# Patient Record
Sex: FEMALE | Race: WHITE | NOT HISPANIC OR LATINO
[De-identification: names, ages, dates, MRNs, and addresses within clinical notes are randomized per-mention and may not be internally consistent; named-entity substitution may affect disease eponyms.]

---

## 2017-09-21 ENCOUNTER — RECORD ABSTRACTING (OUTPATIENT)
Age: 67
End: 2017-09-21

## 2017-09-21 DIAGNOSIS — Z84.89 FAMILY HISTORY OF OTHER SPECIFIED CONDITIONS: ICD-10-CM

## 2017-09-21 DIAGNOSIS — Z83.511 FAMILY HISTORY OF GLAUCOMA: ICD-10-CM

## 2017-09-21 DIAGNOSIS — Z78.9 OTHER SPECIFIED HEALTH STATUS: ICD-10-CM

## 2017-09-21 DIAGNOSIS — Z82.3 FAMILY HISTORY OF STROKE: ICD-10-CM

## 2017-09-21 DIAGNOSIS — Z98.890 OTHER SPECIFIED POSTPROCEDURAL STATES: ICD-10-CM

## 2017-09-21 DIAGNOSIS — Z83.3 FAMILY HISTORY OF DIABETES MELLITUS: ICD-10-CM

## 2017-09-21 DIAGNOSIS — Z82.49 FAMILY HISTORY OF ISCHEMIC HEART DISEASE AND OTHER DISEASES OF THE CIRCULATORY SYSTEM: ICD-10-CM

## 2017-09-21 DIAGNOSIS — Z92.89 PERSONAL HISTORY OF OTHER MEDICAL TREATMENT: ICD-10-CM

## 2017-09-21 DIAGNOSIS — Z80.52 FAMILY HISTORY OF MALIGNANT NEOPLASM OF BLADDER: ICD-10-CM

## 2017-09-21 DIAGNOSIS — Z12.72 ENCOUNTER FOR SCREENING FOR MALIGNANT NEOPLASM OF VAGINA: ICD-10-CM

## 2017-09-21 DIAGNOSIS — Z80.1 FAMILY HISTORY OF MALIGNANT NEOPLASM OF TRACHEA, BRONCHUS AND LUNG: ICD-10-CM

## 2017-09-21 DIAGNOSIS — G47.30 SLEEP APNEA, UNSPECIFIED: ICD-10-CM

## 2017-09-21 DIAGNOSIS — F15.90 OTHER STIMULANT USE, UNSPECIFIED, UNCOMPLICATED: ICD-10-CM

## 2017-10-16 ENCOUNTER — APPOINTMENT (OUTPATIENT)
Dept: FAMILY MEDICINE | Facility: CLINIC | Age: 67
End: 2017-10-16
Payer: COMMERCIAL

## 2017-10-16 VITALS
HEIGHT: 66 IN | WEIGHT: 229 LBS | BODY MASS INDEX: 36.8 KG/M2 | RESPIRATION RATE: 16 BRPM | SYSTOLIC BLOOD PRESSURE: 160 MMHG | DIASTOLIC BLOOD PRESSURE: 94 MMHG | HEART RATE: 75 BPM | OXYGEN SATURATION: 99 %

## 2017-10-16 VITALS — DIASTOLIC BLOOD PRESSURE: 80 MMHG | SYSTOLIC BLOOD PRESSURE: 136 MMHG

## 2017-10-16 PROCEDURE — 99213 OFFICE O/P EST LOW 20 MIN: CPT | Mod: 25

## 2017-10-16 PROCEDURE — G0008: CPT

## 2017-10-16 PROCEDURE — 90662 IIV NO PRSV INCREASED AG IM: CPT

## 2018-01-24 ENCOUNTER — APPOINTMENT (OUTPATIENT)
Dept: FAMILY MEDICINE | Facility: CLINIC | Age: 68
End: 2018-01-24
Payer: COMMERCIAL

## 2018-01-24 VITALS
HEIGHT: 66 IN | DIASTOLIC BLOOD PRESSURE: 78 MMHG | OXYGEN SATURATION: 98 % | HEART RATE: 88 BPM | BODY MASS INDEX: 36.8 KG/M2 | SYSTOLIC BLOOD PRESSURE: 132 MMHG | WEIGHT: 229 LBS

## 2018-01-24 DIAGNOSIS — M50.10 CERVICAL DISC DISORDER WITH RADICULOPATHY, UNSPECIFIED CERVICAL REGION: ICD-10-CM

## 2018-01-24 LAB — HBA1C MFR BLD HPLC: 10.5

## 2018-01-24 PROCEDURE — 99214 OFFICE O/P EST MOD 30 MIN: CPT

## 2018-01-24 RX ORDER — AZITHROMYCIN 250 MG/1
250 TABLET, FILM COATED ORAL
Qty: 6 | Refills: 0 | Status: DISCONTINUED | COMMUNITY
Start: 2017-04-17 | End: 2018-01-24

## 2018-01-24 RX ORDER — SAXAGLIPTIN AND METFORMIN HYDROCHLORIDE 5; 1000 MG/1; MG/1
5-1000 TABLET, FILM COATED, EXTENDED RELEASE ORAL DAILY
Refills: 0 | Status: DISCONTINUED | COMMUNITY
End: 2018-01-24

## 2018-01-24 RX ORDER — FLUOCINONIDE 0.5 MG/G
0.05 CREAM TOPICAL
Qty: 60 | Refills: 0 | Status: DISCONTINUED | COMMUNITY
Start: 2017-07-17 | End: 2018-01-24

## 2018-01-24 RX ORDER — METRONIDAZOLE 500 MG/1
5 TABLET ORAL 3 TIMES DAILY
Refills: 0 | Status: DISCONTINUED | COMMUNITY
End: 2018-01-24

## 2018-01-24 RX ORDER — FLURANDRENOLIDE 4 UG/CM2
TAPE TOPICAL
Refills: 0 | Status: DISCONTINUED | COMMUNITY
End: 2018-01-24

## 2018-04-16 ENCOUNTER — APPOINTMENT (OUTPATIENT)
Dept: FAMILY MEDICINE | Facility: CLINIC | Age: 68
End: 2018-04-16
Payer: COMMERCIAL

## 2018-04-16 VITALS
HEART RATE: 90 BPM | WEIGHT: 222 LBS | SYSTOLIC BLOOD PRESSURE: 124 MMHG | RESPIRATION RATE: 14 BRPM | HEIGHT: 66 IN | OXYGEN SATURATION: 98 % | DIASTOLIC BLOOD PRESSURE: 76 MMHG | BODY MASS INDEX: 35.68 KG/M2

## 2018-04-16 LAB
ALBUMIN: NORMAL
HBA1C MFR BLD HPLC: 7.2

## 2018-04-16 PROCEDURE — 99213 OFFICE O/P EST LOW 20 MIN: CPT

## 2018-04-16 RX ORDER — INSULIN LISPRO 100 [IU]/ML
100 INJECTION, SOLUTION INTRAVENOUS; SUBCUTANEOUS
Qty: 6 | Refills: 0 | Status: DISCONTINUED | COMMUNITY
Start: 2018-01-16 | End: 2018-04-16

## 2018-04-16 RX ORDER — INSULIN GLARGINE 300 U/ML
300 INJECTION, SOLUTION SUBCUTANEOUS
Refills: 0 | Status: DISCONTINUED | COMMUNITY
Start: 2018-01-24 | End: 2018-04-16

## 2018-04-16 RX ORDER — INSULIN ASPART 100 [IU]/ML
100 INJECTION, SOLUTION INTRAVENOUS; SUBCUTANEOUS
Refills: 0 | Status: DISCONTINUED | COMMUNITY
Start: 2018-01-24 | End: 2018-04-16

## 2018-04-16 RX ORDER — CYCLOBENZAPRINE HYDROCHLORIDE 10 MG/1
10 TABLET, FILM COATED ORAL
Qty: 30 | Refills: 1 | Status: DISCONTINUED | COMMUNITY
Start: 2018-01-24 | End: 2018-04-16

## 2018-04-20 ENCOUNTER — TRANSCRIPTION ENCOUNTER (OUTPATIENT)
Age: 68
End: 2018-04-20

## 2018-07-18 ENCOUNTER — APPOINTMENT (OUTPATIENT)
Dept: FAMILY MEDICINE | Facility: CLINIC | Age: 68
End: 2018-07-18
Payer: COMMERCIAL

## 2018-07-18 VITALS — SYSTOLIC BLOOD PRESSURE: 130 MMHG | DIASTOLIC BLOOD PRESSURE: 70 MMHG

## 2018-07-18 VITALS
DIASTOLIC BLOOD PRESSURE: 80 MMHG | BODY MASS INDEX: 35.68 KG/M2 | WEIGHT: 222 LBS | OXYGEN SATURATION: 98 % | HEIGHT: 66 IN | RESPIRATION RATE: 14 BRPM | HEART RATE: 87 BPM | SYSTOLIC BLOOD PRESSURE: 124 MMHG

## 2018-07-18 LAB — HBA1C MFR BLD HPLC: 7

## 2018-07-18 PROCEDURE — 99213 OFFICE O/P EST LOW 20 MIN: CPT

## 2018-07-18 RX ORDER — SOY ISOFLAVONE 40 MG
TABLET ORAL
Refills: 0 | Status: DISCONTINUED | COMMUNITY
End: 2018-07-18

## 2018-07-18 NOTE — ASSESSMENT
[FreeTextEntry1] : her condition is stable, I will obtain her lab results, renewed oxybutynin and she will follow up in 3 months or sooner prn

## 2018-07-18 NOTE — PHYSICAL EXAM
[No Acute Distress] : no acute distress [Well Nourished] : well nourished [Well Developed] : well developed [Well-Appearing] : well-appearing [Normal Voice/Communication] : normal voice/communication [No Respiratory Distress] : no respiratory distress  [Clear to Auscultation] : lungs were clear to auscultation bilaterally [No Accessory Muscle Use] : no accessory muscle use [Normal Rate] : normal rate  [Regular Rhythm] : with a regular rhythm [Normal S1, S2] : normal S1 and S2 [No Murmur] : no murmur heard [Speech Grossly Normal] : speech grossly normal [Memory Grossly Normal] : memory grossly normal [Normal Affect] : the affect was normal [Normal Mood] : the mood was normal [Normal Insight/Judgement] : insight and judgment were intact [de-identified] : trace edema bilateral LE's

## 2018-07-18 NOTE — HISTORY OF PRESENT ILLNESS
[FreeTextEntry1] : patient presents for 3 month check  [de-identified] : She had recent labs from the endocrinologist.  She went to [facility] in june.  The glimepiride was lowered to 2 mg bid.  She is feeling well.  She is declining receiving any pneumococcal vaccines.

## 2018-08-22 ENCOUNTER — RX RENEWAL (OUTPATIENT)
Age: 68
End: 2018-08-22

## 2018-09-04 ENCOUNTER — APPOINTMENT (OUTPATIENT)
Dept: FAMILY MEDICINE | Facility: CLINIC | Age: 68
End: 2018-09-04
Payer: COMMERCIAL

## 2018-09-04 VITALS
OXYGEN SATURATION: 97 % | SYSTOLIC BLOOD PRESSURE: 140 MMHG | RESPIRATION RATE: 14 BRPM | HEART RATE: 81 BPM | WEIGHT: 226 LBS | DIASTOLIC BLOOD PRESSURE: 80 MMHG | BODY MASS INDEX: 36.32 KG/M2 | HEIGHT: 66 IN

## 2018-09-04 VITALS — SYSTOLIC BLOOD PRESSURE: 130 MMHG | DIASTOLIC BLOOD PRESSURE: 74 MMHG

## 2018-09-04 DIAGNOSIS — M25.472 EFFUSION, RIGHT ANKLE: ICD-10-CM

## 2018-09-04 DIAGNOSIS — M25.561 PAIN IN RIGHT KNEE: ICD-10-CM

## 2018-09-04 DIAGNOSIS — L24.9 IRRITANT CONTACT DERMATITIS, UNSPECIFIED CAUSE: ICD-10-CM

## 2018-09-04 DIAGNOSIS — E11.65 TYPE 2 DIABETES MELLITUS WITH HYPERGLYCEMIA: ICD-10-CM

## 2018-09-04 DIAGNOSIS — R59.0 LOCALIZED ENLARGED LYMPH NODES: ICD-10-CM

## 2018-09-04 DIAGNOSIS — M25.471 EFFUSION, RIGHT ANKLE: ICD-10-CM

## 2018-09-04 DIAGNOSIS — Z87.39 PERSONAL HISTORY OF OTHER DISEASES OF THE MUSCULOSKELETAL SYSTEM AND CONNECTIVE TISSUE: ICD-10-CM

## 2018-09-04 PROCEDURE — 99242 OFF/OP CONSLTJ NEW/EST SF 20: CPT

## 2018-09-06 NOTE — HISTORY OF PRESENT ILLNESS
[No Pertinent Cardiac History] : no history of aortic stenosis, atrial fibrillation, coronary artery disease, recent myocardial infarction, or implantable device/pacemaker [Sleep Apnea] : sleep apnea [Self] : previous adverse anesthesia reaction [Diabetes] : diabetes [Aortic Stenosis] : no aortic stenosis [Atrial Fibrillation] : no atrial fibrillation [Coronary Artery Disease] : no coronary artery disease [Recent Myocardial Infarction] : no recent myocardial infarction [Implantable Device/Pacemaker] : no implantable device/pacemaker [Asthma] : no asthma [COPD] : no COPD [Smoker] : not a smoker [Family Member] : no family member with adverse anesthesia reaction/sudden death [Chronic Anticoagulation] : no chronic anticoagulation [Chronic Kidney Disease] : no chronic kidney disease [FreeTextEntry1] : right shoulder surgery [FreeTextEntry2] : to be determined [FreeTextEntry3] : Sandoval Tinajero MD [FreeTextEntry4] : pt presents for medical clearance

## 2018-09-06 NOTE — ASSESSMENT
[FreeTextEntry4] : she is medically stable for surgery as long as preop tests are within an acceptable range

## 2018-09-06 NOTE — PHYSICAL EXAM
[No Acute Distress] : no acute distress [Well Nourished] : well nourished [Well Developed] : well developed [Well-Appearing] : well-appearing [Normal Voice/Communication] : normal voice/communication [Normal Sclera/Conjunctiva] : normal sclera/conjunctiva [Normal Outer Ear/Nose] : the outer ears and nose were normal in appearance [Normal Oropharynx] : the oropharynx was normal [Normal TMs] : both tympanic membranes were normal [No JVD] : no jugular venous distention [Supple] : supple [No Lymphadenopathy] : no lymphadenopathy [Thyroid Normal, No Nodules] : the thyroid was normal and there were no nodules present [No Respiratory Distress] : no respiratory distress  [Clear to Auscultation] : lungs were clear to auscultation bilaterally [No Accessory Muscle Use] : no accessory muscle use [Normal Rate] : normal rate  [Regular Rhythm] : with a regular rhythm [Normal S1, S2] : normal S1 and S2 [No Carotid Bruits] : no carotid bruits [No Edema] : there was no peripheral edema [Soft] : abdomen soft [Non Tender] : non-tender [Non-distended] : non-distended [No HSM] : no HSM [Normal Bowel Sounds] : normal bowel sounds [Speech Grossly Normal] : speech grossly normal [Memory Grossly Normal] : memory grossly normal [Normal Affect] : the affect was normal [Normal Mood] : the mood was normal [Normal Insight/Judgement] : insight and judgment were intact [de-identified] : RUE in sling

## 2018-09-06 NOTE — ADDENDUM
[FreeTextEntry1] : I reviewed her presurgical labs, she is medically stable for surgery.  I reviewed her EKG, it is unchanged from a prior office EKG performed on 1/16/14, she is medically stable for surgery

## 2018-09-27 ENCOUNTER — RECORD ABSTRACTING (OUTPATIENT)
Age: 68
End: 2018-09-27

## 2018-09-27 ENCOUNTER — RESULT CHARGE (OUTPATIENT)
Age: 68
End: 2018-09-27

## 2018-09-27 ENCOUNTER — APPOINTMENT (OUTPATIENT)
Dept: ENDOCRINOLOGY | Facility: CLINIC | Age: 68
End: 2018-09-27
Payer: COMMERCIAL

## 2018-09-27 VITALS
HEART RATE: 99 BPM | WEIGHT: 219 LBS | BODY MASS INDEX: 35.2 KG/M2 | HEIGHT: 66 IN | SYSTOLIC BLOOD PRESSURE: 130 MMHG | DIASTOLIC BLOOD PRESSURE: 80 MMHG

## 2018-09-27 LAB — GLUCOSE BLDC GLUCOMTR-MCNC: 138

## 2018-09-27 PROCEDURE — 99213 OFFICE O/P EST LOW 20 MIN: CPT | Mod: 25

## 2018-09-27 PROCEDURE — 82962 GLUCOSE BLOOD TEST: CPT

## 2018-10-18 ENCOUNTER — MEDICATION RENEWAL (OUTPATIENT)
Age: 68
End: 2018-10-18

## 2018-10-24 ENCOUNTER — APPOINTMENT (OUTPATIENT)
Dept: FAMILY MEDICINE | Facility: CLINIC | Age: 68
End: 2018-10-24
Payer: COMMERCIAL

## 2018-10-24 VITALS
OXYGEN SATURATION: 98 % | DIASTOLIC BLOOD PRESSURE: 88 MMHG | BODY MASS INDEX: 35.68 KG/M2 | WEIGHT: 222 LBS | HEIGHT: 66 IN | SYSTOLIC BLOOD PRESSURE: 140 MMHG | RESPIRATION RATE: 14 BRPM | HEART RATE: 99 BPM

## 2018-10-24 VITALS — DIASTOLIC BLOOD PRESSURE: 84 MMHG | SYSTOLIC BLOOD PRESSURE: 138 MMHG

## 2018-10-24 PROCEDURE — 99213 OFFICE O/P EST LOW 20 MIN: CPT

## 2018-10-24 NOTE — HISTORY OF PRESENT ILLNESS
[FreeTextEntry1] : Pt presents for med check [de-identified] : The shoulder surgery went well and she just started PT.  Dr Tinajero started her on gabapentin 300 mg tid.  She wants to postpone receiving the flu vaccine.  Her recent labs show her diabetes is under much better control.

## 2018-10-24 NOTE — REVIEW OF SYSTEMS
[Negative] : Heme/Lymph [FreeTextEntry9] : pain in right shoulder [de-identified] : skin sensitivity of right upper arm

## 2018-10-24 NOTE — ASSESSMENT
[FreeTextEntry1] : she was advised to continue current medications, her BP is high-normal today but she is in a lot of pain, she will follow up in 3 months or sooner if needed and she will receive a flu vaccine within the next month

## 2018-10-24 NOTE — PHYSICAL EXAM
[No Acute Distress] : no acute distress [Well Nourished] : well nourished [Well Developed] : well developed [Well-Appearing] : well-appearing [Normal Voice/Communication] : normal voice/communication [No Respiratory Distress] : no respiratory distress  [Clear to Auscultation] : lungs were clear to auscultation bilaterally [No Accessory Muscle Use] : no accessory muscle use [Normal Rate] : normal rate  [Regular Rhythm] : with a regular rhythm [Normal S1, S2] : normal S1 and S2 [No Carotid Bruits] : no carotid bruits [No Edema] : there was no peripheral edema [Speech Grossly Normal] : speech grossly normal [Memory Grossly Normal] : memory grossly normal [Normal Mood] : the mood was normal [Normal Insight/Judgement] : insight and judgment were intact

## 2018-11-02 ENCOUNTER — TRANSCRIPTION ENCOUNTER (OUTPATIENT)
Age: 68
End: 2018-11-02

## 2018-11-13 ENCOUNTER — RX RENEWAL (OUTPATIENT)
Age: 68
End: 2018-11-13

## 2019-01-04 ENCOUNTER — RECORD ABSTRACTING (OUTPATIENT)
Age: 69
End: 2019-01-04

## 2019-01-04 DIAGNOSIS — Z87.39 PERSONAL HISTORY OF OTHER DISEASES OF THE MUSCULOSKELETAL SYSTEM AND CONNECTIVE TISSUE: ICD-10-CM

## 2019-01-04 DIAGNOSIS — M50.23 OTHER CERVICAL DISC DISPLACEMENT, CERVICOTHORACIC REGION: ICD-10-CM

## 2019-01-10 ENCOUNTER — RESULT CHARGE (OUTPATIENT)
Age: 69
End: 2019-01-10

## 2019-01-10 ENCOUNTER — APPOINTMENT (OUTPATIENT)
Dept: ENDOCRINOLOGY | Facility: CLINIC | Age: 69
End: 2019-01-10
Payer: COMMERCIAL

## 2019-01-10 VITALS
HEIGHT: 66 IN | HEART RATE: 102 BPM | BODY MASS INDEX: 38.25 KG/M2 | WEIGHT: 238 LBS | DIASTOLIC BLOOD PRESSURE: 80 MMHG | SYSTOLIC BLOOD PRESSURE: 140 MMHG

## 2019-01-10 LAB — GLUCOSE BLDC GLUCOMTR-MCNC: 66

## 2019-01-10 PROCEDURE — 82962 GLUCOSE BLOOD TEST: CPT

## 2019-01-10 PROCEDURE — 99214 OFFICE O/P EST MOD 30 MIN: CPT | Mod: 25

## 2019-01-10 RX ORDER — GABAPENTIN 300 MG/1
300 CAPSULE ORAL 3 TIMES DAILY
Qty: 90 | Refills: 3 | Status: DISCONTINUED | COMMUNITY
End: 2019-01-10

## 2019-01-16 ENCOUNTER — MEDICATION RENEWAL (OUTPATIENT)
Age: 69
End: 2019-01-16

## 2019-01-23 ENCOUNTER — APPOINTMENT (OUTPATIENT)
Dept: FAMILY MEDICINE | Facility: CLINIC | Age: 69
End: 2019-01-23
Payer: COMMERCIAL

## 2019-01-23 VITALS
WEIGHT: 232 LBS | BODY MASS INDEX: 37.28 KG/M2 | SYSTOLIC BLOOD PRESSURE: 132 MMHG | OXYGEN SATURATION: 97 % | DIASTOLIC BLOOD PRESSURE: 76 MMHG | HEART RATE: 89 BPM | HEIGHT: 66 IN

## 2019-01-23 VITALS — DIASTOLIC BLOOD PRESSURE: 80 MMHG | SYSTOLIC BLOOD PRESSURE: 124 MMHG

## 2019-01-23 PROCEDURE — 99213 OFFICE O/P EST LOW 20 MIN: CPT

## 2019-01-23 RX ORDER — INSULIN DETEMIR 100 [IU]/ML
100 INJECTION, SOLUTION SUBCUTANEOUS
Qty: 2 | Refills: 1 | Status: DISCONTINUED | COMMUNITY
Start: 2018-01-12 | End: 2019-01-23

## 2019-01-23 NOTE — ASSESSMENT
[FreeTextEntry1] : she was given emotional support and will follow up with Dr Tinajero in february as scheduled, I renewed oxybutynin and she will follow up in 3 months

## 2019-01-23 NOTE — HISTORY OF PRESENT ILLNESS
[FreeTextEntry1] : Pt presents for 3 month follow up [de-identified] : She still has pain and limited motion with the right shoulder.  She is still in PT.  She will be changing from Levemir to Basaglar and the dose was increased to 24 units.

## 2019-01-23 NOTE — PHYSICAL EXAM
[No Acute Distress] : no acute distress [Well Nourished] : well nourished [Well Developed] : well developed [Well-Appearing] : well-appearing [No Respiratory Distress] : no respiratory distress  [Clear to Auscultation] : lungs were clear to auscultation bilaterally [No Accessory Muscle Use] : no accessory muscle use [Normal Rate] : normal rate  [Regular Rhythm] : with a regular rhythm [Normal S1, S2] : normal S1 and S2 [Speech Grossly Normal] : speech grossly normal [Memory Grossly Normal] : memory grossly normal [Normal Mood] : the mood was normal [Normal Insight/Judgement] : insight and judgment were intact [de-identified] : surgical scars noted in right upper arm, decreased ROM

## 2019-01-23 NOTE — REVIEW OF SYSTEMS
[Negative] : Heme/Lymph [FreeTextEntry9] : right shoulder and upper arm pain, depression in upper arm

## 2019-02-08 ENCOUNTER — MEDICATION RENEWAL (OUTPATIENT)
Age: 69
End: 2019-02-08

## 2019-02-11 ENCOUNTER — TRANSCRIPTION ENCOUNTER (OUTPATIENT)
Age: 69
End: 2019-02-11

## 2019-02-11 ENCOUNTER — RX RENEWAL (OUTPATIENT)
Age: 69
End: 2019-02-11

## 2019-03-04 ENCOUNTER — TRANSCRIPTION ENCOUNTER (OUTPATIENT)
Age: 69
End: 2019-03-04

## 2019-04-10 LAB
HBA1C MFR BLD HPLC: 7.1
LDLC SERPL DIRECT ASSAY-MCNC: 66

## 2019-04-11 ENCOUNTER — RESULT CHARGE (OUTPATIENT)
Age: 69
End: 2019-04-11

## 2019-04-11 ENCOUNTER — APPOINTMENT (OUTPATIENT)
Dept: ENDOCRINOLOGY | Facility: CLINIC | Age: 69
End: 2019-04-11
Payer: COMMERCIAL

## 2019-04-11 VITALS
DIASTOLIC BLOOD PRESSURE: 80 MMHG | WEIGHT: 232 LBS | HEART RATE: 105 BPM | HEIGHT: 66 IN | SYSTOLIC BLOOD PRESSURE: 144 MMHG | BODY MASS INDEX: 37.28 KG/M2

## 2019-04-11 LAB — GLUCOSE BLDC GLUCOMTR-MCNC: 100

## 2019-04-11 PROCEDURE — 82962 GLUCOSE BLOOD TEST: CPT

## 2019-04-11 PROCEDURE — 99214 OFFICE O/P EST MOD 30 MIN: CPT | Mod: 25

## 2019-04-24 ENCOUNTER — APPOINTMENT (OUTPATIENT)
Dept: FAMILY MEDICINE | Facility: CLINIC | Age: 69
End: 2019-04-24
Payer: COMMERCIAL

## 2019-04-24 VITALS — DIASTOLIC BLOOD PRESSURE: 78 MMHG | SYSTOLIC BLOOD PRESSURE: 126 MMHG

## 2019-04-24 VITALS
DIASTOLIC BLOOD PRESSURE: 68 MMHG | WEIGHT: 230 LBS | HEART RATE: 114 BPM | OXYGEN SATURATION: 97 % | BODY MASS INDEX: 36.96 KG/M2 | RESPIRATION RATE: 14 BRPM | SYSTOLIC BLOOD PRESSURE: 114 MMHG | HEIGHT: 66 IN

## 2019-04-24 LAB — MICROALBUMIN 24H UR DL<=1MG/L-MCNC: NORMAL

## 2019-04-24 PROCEDURE — 99213 OFFICE O/P EST LOW 20 MIN: CPT

## 2019-04-24 NOTE — PLAN
[FreeTextEntry1] : I renwed lisinopril, she will follow up with ortho as scheduled and with me in 3 months or sooner prn

## 2019-04-24 NOTE — HISTORY OF PRESENT ILLNESS
[FreeTextEntry1] : Pt presents for 3 month follow up [de-identified] : She is still having discomfort in the right shoulder especially when lifting it above her head.  Her diabetes is better controlled.  She is walking her dogs again and happy about that.

## 2019-04-24 NOTE — PHYSICAL EXAM
[No Acute Distress] : no acute distress [Well Developed] : well developed [Well Nourished] : well nourished [Well-Appearing] : well-appearing [No Respiratory Distress] : no respiratory distress  [Normal Voice/Communication] : normal voice/communication [Clear to Auscultation] : lungs were clear to auscultation bilaterally [No Accessory Muscle Use] : no accessory muscle use [Regular Rhythm] : with a regular rhythm [Normal Rate] : normal rate  [Normal S1, S2] : normal S1 and S2 [Speech Grossly Normal] : speech grossly normal [Normal Affect] : the affect was normal [Memory Grossly Normal] : memory grossly normal [Normal Insight/Judgement] : insight and judgment were intact [Normal Mood] : the mood was normal

## 2019-07-10 LAB
HBA1C MFR BLD HPLC: 7.1
LDLC SERPL DIRECT ASSAY-MCNC: 66

## 2019-07-11 ENCOUNTER — RESULT CHARGE (OUTPATIENT)
Age: 69
End: 2019-07-11

## 2019-07-11 ENCOUNTER — APPOINTMENT (OUTPATIENT)
Dept: ENDOCRINOLOGY | Facility: CLINIC | Age: 69
End: 2019-07-11
Payer: COMMERCIAL

## 2019-07-11 VITALS
DIASTOLIC BLOOD PRESSURE: 84 MMHG | WEIGHT: 241 LBS | HEIGHT: 66 IN | HEART RATE: 89 BPM | BODY MASS INDEX: 38.73 KG/M2 | SYSTOLIC BLOOD PRESSURE: 134 MMHG

## 2019-07-11 LAB — GLUCOSE BLDC GLUCOMTR-MCNC: 115

## 2019-07-11 PROCEDURE — 82962 GLUCOSE BLOOD TEST: CPT

## 2019-07-11 PROCEDURE — 99214 OFFICE O/P EST MOD 30 MIN: CPT | Mod: 25

## 2019-07-15 ENCOUNTER — APPOINTMENT (OUTPATIENT)
Dept: FAMILY MEDICINE | Facility: CLINIC | Age: 69
End: 2019-07-15
Payer: COMMERCIAL

## 2019-07-15 ENCOUNTER — APPOINTMENT (OUTPATIENT)
Dept: FAMILY MEDICINE | Facility: CLINIC | Age: 69
End: 2019-07-15

## 2019-07-15 VITALS
HEART RATE: 72 BPM | DIASTOLIC BLOOD PRESSURE: 78 MMHG | OXYGEN SATURATION: 98 % | WEIGHT: 244 LBS | BODY MASS INDEX: 39.38 KG/M2 | SYSTOLIC BLOOD PRESSURE: 128 MMHG | RESPIRATION RATE: 12 BRPM

## 2019-07-15 DIAGNOSIS — R60.9 EDEMA, UNSPECIFIED: ICD-10-CM

## 2019-07-15 PROCEDURE — 99213 OFFICE O/P EST LOW 20 MIN: CPT

## 2019-07-15 NOTE — PLAN
[FreeTextEntry1] : she was advised to apply bacitracin ointment and to wear compression stockings and elevate legs, she will continue heat to her neck and will follow up in 3 months or sooner prn

## 2019-07-15 NOTE — HISTORY OF PRESENT ILLNESS
[FreeTextEntry1] : pt presents for 3 month check  [de-identified] : She was doing exercises for the arm and neck and she injured her neck on the left side.  She still has pain and weakness in the right arm.  She just saw the endocrinologist and no changes were made.  She finds that if she has a carb with her meals she can prevent her sugar from dropping too low.  She was in [place] in may and thought she was bitten by a bug but the area is still itchy and hasn't healed.  Her legs are swelling

## 2019-07-15 NOTE — REVIEW OF SYSTEMS
[Lower Ext Edema] : lower extremity edema [Negative] : Heme/Lymph [FreeTextEntry9] : neck and right arm pain [de-identified] : itchy bites on right leg

## 2019-07-15 NOTE — PHYSICAL EXAM
[No Acute Distress] : no acute distress [Well Nourished] : well nourished [Well Developed] : well developed [Well-Appearing] : well-appearing [Normal Voice/Communication] : normal voice/communication [No Respiratory Distress] : no respiratory distress  [No Accessory Muscle Use] : no accessory muscle use [Clear to Auscultation] : lungs were clear to auscultation bilaterally [Normal Rate] : normal rate  [Regular Rhythm] : with a regular rhythm [Normal S1, S2] : normal S1 and S2 [Speech Grossly Normal] : speech grossly normal [Memory Grossly Normal] : memory grossly normal [Normal Mood] : the mood was normal [Normal Insight/Judgement] : insight and judgment were intact [de-identified] : varicosities of bilateral LE, mild swelling of bilateral LE [de-identified] : 2 small, superficial abrasions on right lateral ankle

## 2019-07-23 ENCOUNTER — TRANSCRIPTION ENCOUNTER (OUTPATIENT)
Age: 69
End: 2019-07-23

## 2019-07-23 ENCOUNTER — MEDICATION RENEWAL (OUTPATIENT)
Age: 69
End: 2019-07-23

## 2019-08-19 ENCOUNTER — TRANSCRIPTION ENCOUNTER (OUTPATIENT)
Age: 69
End: 2019-08-19

## 2019-10-04 ENCOUNTER — TRANSCRIPTION ENCOUNTER (OUTPATIENT)
Age: 69
End: 2019-10-04

## 2019-10-04 ENCOUNTER — MEDICATION RENEWAL (OUTPATIENT)
Age: 69
End: 2019-10-04

## 2019-10-07 ENCOUNTER — TRANSCRIPTION ENCOUNTER (OUTPATIENT)
Age: 69
End: 2019-10-07

## 2019-10-09 LAB
HBA1C MFR BLD HPLC: 7.3
LDLC SERPL DIRECT ASSAY-MCNC: 71

## 2019-10-10 ENCOUNTER — APPOINTMENT (OUTPATIENT)
Dept: ENDOCRINOLOGY | Facility: CLINIC | Age: 69
End: 2019-10-10
Payer: COMMERCIAL

## 2019-10-10 ENCOUNTER — RESULT CHARGE (OUTPATIENT)
Age: 69
End: 2019-10-10

## 2019-10-10 VITALS
HEIGHT: 66 IN | SYSTOLIC BLOOD PRESSURE: 166 MMHG | BODY MASS INDEX: 39.21 KG/M2 | HEART RATE: 90 BPM | WEIGHT: 244 LBS | DIASTOLIC BLOOD PRESSURE: 94 MMHG

## 2019-10-10 LAB — GLUCOSE BLDC GLUCOMTR-MCNC: 74

## 2019-10-10 PROCEDURE — 99214 OFFICE O/P EST MOD 30 MIN: CPT | Mod: 25

## 2019-10-10 PROCEDURE — 82962 GLUCOSE BLOOD TEST: CPT

## 2019-10-10 RX ORDER — MUPIROCIN 20 MG/G
2 OINTMENT TOPICAL 3 TIMES DAILY
Qty: 1 | Refills: 2 | Status: DISCONTINUED | COMMUNITY
Start: 2019-07-15 | End: 2019-10-10

## 2019-10-10 NOTE — HISTORY OF PRESENT ILLNESS
[FreeTextEntry1] : T2DM dx in 2017 with A1c 10.7% - she had been on Kombiglyze and MFN for several years prior to 2017 but reported that she did not know she had diabetes. \par FS today 72 - after 1/2 sandwich and bowl of soup and usual amount of insulin before meal, she is asymptomatic\par \par Current DM meds:\par Glimepiride 4 mg 1/2 tablet BID\par Kombiglyze 2.5/1000 mg BID\par Basaglar 24 units nightly\par Humalog 5-8-8\par Humalog scale 151-200 2 units, 201-300 4 units, 301+ 6\par \par SMBG: testing 4x daily, logs scanned in. denies lows, lowest FS 68\par fasting 110-150\par lunch \par dinner \par -180\par \par Diet: trying to watch diet, eats late at night\par Exercise: not at this time, torn rotator cuff and Left ankle sprain\par \par Complications:\par 2/5/19 eye exam - no DR, denies neuropathy\par 1/2019 neg urine microalb/Cr\par denies dysesthesias\par \par \par

## 2019-10-10 NOTE — PHYSICAL EXAM
[Alert] : alert [No Acute Distress] : no acute distress [Well Nourished] : well nourished [Well Developed] : well developed [PERRL] : pupils equal, round and reactive to light [EOMI] : extra ocular movement intact [Normal Rate] : heart rate was normal  [Normal Rate and Effort] : normal respiratory rhythm and effort [Clear to Auscultation] : lungs were clear to auscultation bilaterally [No Edema] : there was no peripheral edema [Normal S1, S2] : normal S1 and S2 [Normal Bowel Sounds] : normal bowel sounds [Not Tender] : non-tender [Soft] : abdomen soft [Not Distended] : not distended [Normal Gait] : normal gait [Cranial Nerves Intact] : cranial nerves 2-12 were intact [Oriented x3] : oriented to person, place, and time [Normal Reflexes] : deep tendon reflexes were 2+ and symmetric [Normal Insight/Judgement] : insight and judgment were intact [Normal Affect] : the affect was normal [Normal Mood] : the mood was normal [Foot Ulcers] : no foot ulcers

## 2019-10-10 NOTE — ASSESSMENT
[FreeTextEntry1] : 1. T2DM - stable A1c, logs reviewed overall good control, pt reports occasional mild hypoglycemia pst meal due to decreased PO intake like today\par - pt given juice at visit and remained in office until glucoses improved, repeat FS 92\par - cont current insulin doses, suggest 5-6 units Humalog if having a lighter meal\par - discussed risks of hypoglycemia; she does not want to stop Glimepiride at this time\par - cont to test 4x daily and call with highs/lows, sends logs\par - repeat A1c 3 months\par - cont Janumet and Glimepiride\par - yearly eye exam with ophthalmology\par \par 2. HTN - asx, after high salt lunch, increase PO water intake, low salt diet, cont current BP meds\par

## 2019-10-10 NOTE — REVIEW OF SYSTEMS
[Fatigue] : no fatigue [Recent Weight Gain (___ Lbs)] : no recent weight gain [Recent Weight Loss (___ Lbs)] : no recent weight loss [Blurry Vision] : no blurred vision [Chest Pain] : no chest pain [Palpitations] : no palpitations [Shortness Of Breath] : no shortness of breath [SOB on Exertion] : no shortness of breath during exertion [Nausea] : no nausea [Vomiting] : no vomiting was observed [Constipation] : no constipation [Diarrhea] : no diarrhea [Abdominal Pain] : no abdominal pain [Polyuria] : no polyuria [Nocturia] : no nocturia [Pain/Numbness of Digits] : no pain/numbness of digits [Depression] : no depression [Anxiety] : no anxiety [Polydipsia] : no polydipsia [FreeTextEntry9] : Right arm/shoulder pain due to injury

## 2019-10-10 NOTE — DATA REVIEWED
[FreeTextEntry1] : Labs 9.22.18\par Cr 0.91\par LDl chol 73\par HDl 52\par A1c 6.2%\par \par Labs 1/5/18\par Cr 0.85, GFR 67\par LDl chol 76, Tg 84\par A1c 7.3\par urine microalb/Cr 0\par \par Labs 4.6.19\par Gluc 106, A1c 7.1\par Cr 0.81, GFR 71\par LDL 66, HDL 55, Tg 68\par TSh 3.75\par \par Labs 7/6/19\par LDL 66, Tg 76, HDL 55\par A1c 7.1\par \par labs 10/5/19\par Gluc 162, A1c 7.2\par Cr 0.91, GFR 61\par LDL 71, HDL 50, Tg 96

## 2019-10-19 ENCOUNTER — APPOINTMENT (OUTPATIENT)
Dept: FAMILY MEDICINE | Facility: CLINIC | Age: 69
End: 2019-10-19

## 2019-10-19 ENCOUNTER — APPOINTMENT (OUTPATIENT)
Dept: FAMILY MEDICINE | Facility: CLINIC | Age: 69
End: 2019-10-19
Payer: COMMERCIAL

## 2019-10-19 VITALS — SYSTOLIC BLOOD PRESSURE: 130 MMHG | DIASTOLIC BLOOD PRESSURE: 80 MMHG

## 2019-10-19 VITALS
BODY MASS INDEX: 39.21 KG/M2 | DIASTOLIC BLOOD PRESSURE: 78 MMHG | WEIGHT: 244 LBS | HEIGHT: 66 IN | SYSTOLIC BLOOD PRESSURE: 132 MMHG | HEART RATE: 94 BPM | RESPIRATION RATE: 14 BRPM | OXYGEN SATURATION: 95 %

## 2019-10-19 DIAGNOSIS — M75.100 UNSPECIFIED ROTATOR CUFF TEAR OR RUPTURE OF UNSPECIFIED SHOULDER, NOT SPECIFIED AS TRAUMATIC: ICD-10-CM

## 2019-10-19 DIAGNOSIS — L08.9 ABRASION, LEFT LOWER LEG, INITIAL ENCOUNTER: ICD-10-CM

## 2019-10-19 DIAGNOSIS — S80.812A ABRASION, LEFT LOWER LEG, INITIAL ENCOUNTER: ICD-10-CM

## 2019-10-19 PROCEDURE — 90674 CCIIV4 VAC NO PRSV 0.5 ML IM: CPT

## 2019-10-19 PROCEDURE — 99213 OFFICE O/P EST LOW 20 MIN: CPT | Mod: 25

## 2019-10-19 PROCEDURE — G0008: CPT

## 2019-10-19 NOTE — HISTORY OF PRESENT ILLNESS
[FreeTextEntry1] : patient presents for 3 month follow up\par  [de-identified] : She saw the endocrinologist and overall the diabetes is stable.  She followed up with the orthopedist and was diagnosed with another torn rotator cuff and she is in PT.  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  The pain is overall the same

## 2019-10-19 NOTE — PLAN
[FreeTextEntry1] : flu vaccine was given, renewed clobetasol cream, she will continue current dose of lisinopril, she wants to wean off the oxybutynin and she will follow up in 4 months or sooner prn

## 2019-10-19 NOTE — PHYSICAL EXAM
[No Acute Distress] : no acute distress [Well Nourished] : well nourished [Well Developed] : well developed [Well-Appearing] : well-appearing [Normal Voice/Communication] : normal voice/communication [No Respiratory Distress] : no respiratory distress  [No Accessory Muscle Use] : no accessory muscle use [Normal Rate] : normal rate  [Clear to Auscultation] : lungs were clear to auscultation bilaterally [Regular Rhythm] : with a regular rhythm [Normal S1, S2] : normal S1 and S2 [No Carotid Bruits] : no carotid bruits [Speech Grossly Normal] : speech grossly normal [Normal Affect] : the affect was normal [Memory Grossly Normal] : memory grossly normal [Normal Mood] : the mood was normal [Normal Insight/Judgement] : insight and judgment were intact [de-identified] : trace edema of bilateral LE

## 2019-10-19 NOTE — REVIEW OF SYSTEMS
[Negative] : Heme/Lymph [FreeTextEntry5] : left ankle swelling [FreeTextEntry9] : right shoulder pain

## 2019-11-25 ENCOUNTER — RX RENEWAL (OUTPATIENT)
Age: 69
End: 2019-11-25

## 2019-11-25 ENCOUNTER — TRANSCRIPTION ENCOUNTER (OUTPATIENT)
Age: 69
End: 2019-11-25

## 2019-12-24 ENCOUNTER — TRANSCRIPTION ENCOUNTER (OUTPATIENT)
Age: 69
End: 2019-12-24

## 2019-12-24 ENCOUNTER — RX RENEWAL (OUTPATIENT)
Age: 69
End: 2019-12-24

## 2019-12-24 ENCOUNTER — MEDICATION RENEWAL (OUTPATIENT)
Age: 69
End: 2019-12-24

## 2019-12-26 ENCOUNTER — TRANSCRIPTION ENCOUNTER (OUTPATIENT)
Age: 69
End: 2019-12-26

## 2020-01-02 ENCOUNTER — TRANSCRIPTION ENCOUNTER (OUTPATIENT)
Age: 70
End: 2020-01-02

## 2020-01-08 LAB
HBA1C MFR BLD HPLC: 7.5
LDLC SERPL DIRECT ASSAY-MCNC: 68

## 2020-01-09 ENCOUNTER — RESULT CHARGE (OUTPATIENT)
Age: 70
End: 2020-01-09

## 2020-01-09 ENCOUNTER — APPOINTMENT (OUTPATIENT)
Dept: ENDOCRINOLOGY | Facility: CLINIC | Age: 70
End: 2020-01-09
Payer: COMMERCIAL

## 2020-01-09 VITALS
WEIGHT: 231 LBS | HEIGHT: 66 IN | HEART RATE: 90 BPM | SYSTOLIC BLOOD PRESSURE: 136 MMHG | BODY MASS INDEX: 37.12 KG/M2 | DIASTOLIC BLOOD PRESSURE: 84 MMHG

## 2020-01-09 DIAGNOSIS — Z79.899 OTHER LONG TERM (CURRENT) DRUG THERAPY: ICD-10-CM

## 2020-01-09 LAB — GLUCOSE BLDC GLUCOMTR-MCNC: 147

## 2020-01-09 PROCEDURE — 82962 GLUCOSE BLOOD TEST: CPT

## 2020-01-09 PROCEDURE — 99214 OFFICE O/P EST MOD 30 MIN: CPT | Mod: 25

## 2020-01-09 NOTE — REVIEW OF SYSTEMS
[Recent Weight Loss (___ Lbs)] : recent [unfilled] ~Ulb weight loss [Fatigue] : no fatigue [Recent Weight Gain (___ Lbs)] : no recent weight gain [Blurry Vision] : no blurred vision [Chest Pain] : no chest pain [Palpitations] : no palpitations [Shortness Of Breath] : no shortness of breath [SOB on Exertion] : no shortness of breath during exertion [Nausea] : no nausea [Vomiting] : no vomiting was observed [Diarrhea] : no diarrhea [Constipation] : no constipation [Abdominal Pain] : no abdominal pain [Polyuria] : no polyuria [Nocturia] : no nocturia [Depression] : no depression [Pain/Numbness of Digits] : no pain/numbness of digits [Anxiety] : no anxiety [Polydipsia] : no polydipsia [FreeTextEntry9] : Right arm/shoulder pain due to injury

## 2020-01-09 NOTE — DATA REVIEWED
[FreeTextEntry1] : Labs 9.22.18\par Cr 0.91\par LDl chol 73\par HDl 52\par A1c 6.2%\par \par Labs 1/5/18\par Cr 0.85, GFR 67\par LDl chol 76, Tg 84\par A1c 7.3\par urine microalb/Cr 0\par \par Labs 4.6.19\par Gluc 106, A1c 7.1\par Cr 0.81, GFR 71\par LDL 66, HDL 55, Tg 68\par TSh 3.75\par \par Labs 7/6/19\par LDL 66, Tg 76, HDL 55\par A1c 7.1\par \par labs 10/5/19\par Gluc 162, A1c 7.2\par Cr 0.91, GFR 61\par LDL 71, HDL 50, Tg 96\par \par Labs 1/4/20\par Gluc 139, A1c 7.5\par Cr 0.84, GFR 67\par LDL 68, Tg 95, HDL 55\par

## 2020-01-09 NOTE — HISTORY OF PRESENT ILLNESS
[FreeTextEntry1] : T2DM dx in 2017 with A1c 10.7% - she had been on Kombiglyze and MFN for several years prior to 2017 but reported that she did not know she had diabetes. \par reports worsening glucose over holidays\par does not want Riana sensor\par \par Current DM meds:\par Glimepiride 2 mg 1 BID\par Kombiglyze 2.5/1000 mg BID\par Lantus 24 units nightly\par Humalog 5-8-8\par Humalog scale 151-200 2 units, 201-300 4 units, 301+ 6\par \par SMBG: testing 4x daily, logs scanned in. denies lows\par fasting 130-170's higher in december 2019\par lunch 160-170\par dinner 140893\par -200s\par \par Diet: trying to watch diet, eats late at night\par Exercise: not at this time, torn rotator cuff and Left ankle sprain\par \par Complications:\par 2/5/19 eye exam - no DR, denies neuropathy\par 1/2019 neg urine microalb/Cr\par denies dysesthesias\par \par \par

## 2020-01-09 NOTE — ASSESSMENT
[FreeTextEntry1] : 1. T2DM - worsening A1c due to diet\par - cont Glimepiride, Kombiglyze\par - increase Lantus to 28 units daily\par - cont Humalog doses\par - cont to test 4x daily and call with highs/lows, sends logs\par - yearly eye exam with ophthalmology\par - check B12 level on MFN\par \par 2. HTN -  cont current BP meds\par

## 2020-01-09 NOTE — PHYSICAL EXAM
[No Acute Distress] : no acute distress [Alert] : alert [Well Developed] : well developed [Well Nourished] : well nourished [EOMI] : extra ocular movement intact [PERRL] : pupils equal, round and reactive to light [Normal Rate and Effort] : normal respiratory rhythm and effort [Clear to Auscultation] : lungs were clear to auscultation bilaterally [Normal Rate] : heart rate was normal  [Normal S1, S2] : normal S1 and S2 [No Edema] : there was no peripheral edema [Not Tender] : non-tender [Soft] : abdomen soft [Normal Bowel Sounds] : normal bowel sounds [Normal Gait] : normal gait [Not Distended] : not distended [Cranial Nerves Intact] : cranial nerves 2-12 were intact [Normal Reflexes] : deep tendon reflexes were 2+ and symmetric [Oriented x3] : oriented to person, place, and time [Normal Insight/Judgement] : insight and judgment were intact [Normal Affect] : the affect was normal [Normal Mood] : the mood was normal [Foot Ulcers] : no foot ulcers

## 2020-02-15 ENCOUNTER — APPOINTMENT (OUTPATIENT)
Dept: FAMILY MEDICINE | Facility: CLINIC | Age: 70
End: 2020-02-15
Payer: COMMERCIAL

## 2020-02-15 VITALS
WEIGHT: 231 LBS | RESPIRATION RATE: 14 BRPM | HEART RATE: 115 BPM | SYSTOLIC BLOOD PRESSURE: 146 MMHG | OXYGEN SATURATION: 98 % | HEIGHT: 66 IN | BODY MASS INDEX: 37.12 KG/M2 | DIASTOLIC BLOOD PRESSURE: 70 MMHG

## 2020-02-15 VITALS — SYSTOLIC BLOOD PRESSURE: 140 MMHG | DIASTOLIC BLOOD PRESSURE: 70 MMHG

## 2020-02-15 DIAGNOSIS — M19.019 PRIMARY OSTEOARTHRITIS, UNSPECIFIED SHOULDER: ICD-10-CM

## 2020-02-15 PROCEDURE — 99213 OFFICE O/P EST LOW 20 MIN: CPT

## 2020-02-15 RX ORDER — MELOXICAM 15 MG/1
15 TABLET ORAL
Qty: 30 | Refills: 0 | Status: DISCONTINUED | COMMUNITY
Start: 2020-01-16 | End: 2020-02-15

## 2020-02-15 NOTE — PHYSICAL EXAM
[No Acute Distress] : no acute distress [Well Nourished] : well nourished [Well Developed] : well developed [Well-Appearing] : well-appearing [Normal Voice/Communication] : normal voice/communication [No Accessory Muscle Use] : no accessory muscle use [No Respiratory Distress] : no respiratory distress  [Clear to Auscultation] : lungs were clear to auscultation bilaterally [Regular Rhythm] : with a regular rhythm [Normal Rate] : normal rate  [Normal S1, S2] : normal S1 and S2 [No Murmur] : no murmur heard [No Carotid Bruits] : no carotid bruits [Memory Grossly Normal] : memory grossly normal [No Edema] : there was no peripheral edema [Coordination Grossly Intact] : coordination grossly intact [Speech Grossly Normal] : speech grossly normal [Normal Mood] : the mood was normal [Normal Affect] : the affect was normal [Normal Insight/Judgement] : insight and judgment were intact

## 2020-02-15 NOTE — HISTORY OF PRESENT ILLNESS
[de-identified] : She is suffering with pain in the right shoulder, left ankle and just having general joint pain all over.  She walks her dogs for exercise. [FreeTextEntry1] : pt presents for blood pressure check

## 2020-03-19 ENCOUNTER — TRANSCRIPTION ENCOUNTER (OUTPATIENT)
Age: 70
End: 2020-03-19

## 2020-03-20 ENCOUNTER — TRANSCRIPTION ENCOUNTER (OUTPATIENT)
Age: 70
End: 2020-03-20

## 2020-04-20 ENCOUNTER — TRANSCRIPTION ENCOUNTER (OUTPATIENT)
Age: 70
End: 2020-04-20

## 2020-04-30 ENCOUNTER — APPOINTMENT (OUTPATIENT)
Dept: ENDOCRINOLOGY | Facility: CLINIC | Age: 70
End: 2020-04-30
Payer: COMMERCIAL

## 2020-04-30 LAB — CYTOLOGY CVX/VAG DOC THIN PREP: NORMAL

## 2020-04-30 PROCEDURE — 99441: CPT

## 2020-05-09 ENCOUNTER — TRANSCRIPTION ENCOUNTER (OUTPATIENT)
Age: 70
End: 2020-05-09

## 2020-06-02 ENCOUNTER — TRANSCRIPTION ENCOUNTER (OUTPATIENT)
Age: 70
End: 2020-06-02

## 2020-06-05 ENCOUNTER — TRANSCRIPTION ENCOUNTER (OUTPATIENT)
Age: 70
End: 2020-06-05

## 2020-06-09 ENCOUNTER — RX RENEWAL (OUTPATIENT)
Age: 70
End: 2020-06-09

## 2020-06-22 ENCOUNTER — RX RENEWAL (OUTPATIENT)
Age: 70
End: 2020-06-22

## 2020-07-06 ENCOUNTER — APPOINTMENT (OUTPATIENT)
Dept: FAMILY MEDICINE | Facility: CLINIC | Age: 70
End: 2020-07-06
Payer: COMMERCIAL

## 2020-07-06 VITALS
SYSTOLIC BLOOD PRESSURE: 130 MMHG | TEMPERATURE: 97.8 F | HEART RATE: 81 BPM | DIASTOLIC BLOOD PRESSURE: 80 MMHG | HEIGHT: 66 IN | RESPIRATION RATE: 14 BRPM | OXYGEN SATURATION: 98 %

## 2020-07-06 VITALS — DIASTOLIC BLOOD PRESSURE: 80 MMHG | SYSTOLIC BLOOD PRESSURE: 136 MMHG

## 2020-07-06 PROCEDURE — 99213 OFFICE O/P EST LOW 20 MIN: CPT

## 2020-07-06 NOTE — PHYSICAL EXAM
[Well Nourished] : well nourished [No Acute Distress] : no acute distress [Normal Voice/Communication] : normal voice/communication [Well Developed] : well developed [Well-Appearing] : well-appearing [Supple] : supple [No Respiratory Distress] : no respiratory distress  [No Accessory Muscle Use] : no accessory muscle use [Clear to Auscultation] : lungs were clear to auscultation bilaterally [Normal Rate] : normal rate  [Regular Rhythm] : with a regular rhythm [Normal S1, S2] : normal S1 and S2 [No Carotid Bruits] : no carotid bruits [Coordination Grossly Intact] : coordination grossly intact [No Focal Deficits] : no focal deficits [Speech Grossly Normal] : speech grossly normal [Memory Grossly Normal] : memory grossly normal [Normal Mood] : the mood was normal [Normal Affect] : the affect was normal [Normal Insight/Judgement] : insight and judgment were intact

## 2020-07-06 NOTE — HISTORY OF PRESENT ILLNESS
[FreeTextEntry1] : patient presents for blood pressure check\par  [de-identified] : She is still using up the last of the Basaglar and then will change to Lantus.  She is walking the dogs for exercise.

## 2020-07-06 NOTE — PLAN
[FreeTextEntry1] : she was advised to continue healthy diet, exercise and medications, she will follow up for a wellness visit

## 2020-07-07 ENCOUNTER — RX RENEWAL (OUTPATIENT)
Age: 70
End: 2020-07-07

## 2020-07-10 ENCOUNTER — TRANSCRIPTION ENCOUNTER (OUTPATIENT)
Age: 70
End: 2020-07-10

## 2020-08-12 LAB — HBA1C MFR BLD HPLC: 7.2

## 2020-08-13 ENCOUNTER — APPOINTMENT (OUTPATIENT)
Dept: ENDOCRINOLOGY | Facility: CLINIC | Age: 70
End: 2020-08-13
Payer: COMMERCIAL

## 2020-08-13 PROCEDURE — 99441: CPT

## 2020-08-13 NOTE — REASON FOR VISIT
[Other Location: e.g. School (Enter Location, City,State)___] : at [unfilled], at the time of the visit. [Medical Office: ([facility])___] : at the medical office located in  [Verbal consent obtained from patient] : the patient, [unfilled] [DM Type 2] : DM Type 2 [Follow - Up] : a follow-up visit [Other___] : [unfilled]

## 2020-08-13 NOTE — DATA REVIEWED
[FreeTextEntry1] : Labs 9.22.18\par Cr 0.91\par LDl chol 73\par HDl 52\par A1c 6.2%\par \par Labs 1/5/18\par Cr 0.85, GFR 67\par LDl chol 76, Tg 84\par A1c 7.3\par urine microalb/Cr 0\par \par Labs 4.6.19\par Gluc 106, A1c 7.1\par Cr 0.81, GFR 71\par LDL 66, HDL 55, Tg 68\par TSh 3.75\par \par Labs 7/6/19\par LDL 66, Tg 76, HDL 55\par A1c 7.1\par \par labs 10/5/19\par Gluc 162, A1c 7.2\par Cr 0.91, GFR 61\par LDL 71, HDL 50, Tg 96\par \par Labs 1/4/20\par Gluc 139, A1c 7.5\par Cr 0.84, GFR 67\par LDL 68, Tg 95, HDL 55\par \par Labs 8/8/20 A1c 7.2\par

## 2020-08-13 NOTE — ASSESSMENT
[FreeTextEntry1] : 1. T2DM - worsening A1c due to diet\par - cont Glimepiride, Kombiglyze\par - cont basal/bolus insulin doses\par - cont to test 4x daily and call with highs/lows, sends logs\par - yearly eye exam with ophthalmology\par - check B12 level on MFN\par \par 2. HTN -  cont current BP meds\par

## 2020-08-13 NOTE — HISTORY OF PRESENT ILLNESS
[FreeTextEntry1] : Interval Hx- no issues, no changes. had issue w pen needles - defective batch\par \par T2DM dx in 2017 with A1c 10.7% - she had been on Kombiglyze and MFN for several years prior to 2017 but reported that she did not know she had diabetes. \par reports worsening glucose over holidays\par does not want Riana sensor\par \par Current DM meds:\par Glimepiride 2 mg 1 BID\par Kombiglyze 2.5/1000 mg BID\par Lantus 36 units nightly\par Humalog 7-10-10\par Humalog scale 151-200 2 units, 201-300 4 units, 301+ 6\par \par SMBG: testing 4x daily, logs scanned in. denies lows, -180's\par \par Diet: trying to watch diet, eats late at night\par Exercise: not at this time, torn rotator cuff and Left ankle sprain\par \par Complications:\par 2/5/19 eye exam - no DR, denies neuropathy\par 4/2020 neg urine microalb/Cr\par denies dysesthesias\par \par \par

## 2020-08-13 NOTE — REVIEW OF SYSTEMS
[Recent Weight Gain (___ Lbs)] : recent weight gain: [unfilled] lbs [Recent Weight Loss (___ Lbs)] : no recent weight loss [Blurred Vision] : no blurred vision [Shortness Of Breath] : no shortness of breath [SOB on Exertion] : no shortness of breath on exertion [Chest Pain] : no chest pain [Nausea] : no nausea [Abdominal Pain] : no abdominal pain [Nocturia] : no nocturia [Polyuria] : no polyuria [Depression] : no depression [Pain/Numbness of Digits] : no pain/numbness of digits [Polydipsia] : no polydipsia [Anxiety] : no anxiety

## 2020-09-28 ENCOUNTER — TRANSCRIPTION ENCOUNTER (OUTPATIENT)
Age: 70
End: 2020-09-28

## 2020-11-09 ENCOUNTER — TRANSCRIPTION ENCOUNTER (OUTPATIENT)
Age: 70
End: 2020-11-09

## 2020-11-19 ENCOUNTER — RESULT CHARGE (OUTPATIENT)
Age: 70
End: 2020-11-19

## 2020-11-19 ENCOUNTER — APPOINTMENT (OUTPATIENT)
Dept: ENDOCRINOLOGY | Facility: CLINIC | Age: 70
End: 2020-11-19
Payer: COMMERCIAL

## 2020-11-19 VITALS
DIASTOLIC BLOOD PRESSURE: 76 MMHG | SYSTOLIC BLOOD PRESSURE: 132 MMHG | BODY MASS INDEX: 37.93 KG/M2 | OXYGEN SATURATION: 98 % | WEIGHT: 236 LBS | HEART RATE: 95 BPM | HEIGHT: 66 IN

## 2020-11-19 LAB — GLUCOSE BLDC GLUCOMTR-MCNC: 113

## 2020-11-19 PROCEDURE — 99214 OFFICE O/P EST MOD 30 MIN: CPT | Mod: 25

## 2020-11-19 PROCEDURE — 82962 GLUCOSE BLOOD TEST: CPT

## 2020-11-19 NOTE — HISTORY OF PRESENT ILLNESS
[FreeTextEntry1] : Interval Hx- no issues, no changes. \par \par T2DM dx in 2017 with A1c 10.7% - she had been on Kombiglyze and MFN for several years prior to 2017 but reported that she did not know she had diabetes. \par reports worsening glucose over holidays\par does not want Riana sensor\par \par Current DM meds:\par Glimepiride 2 mg 1 BID\par Kombiglyze 2.5/1000 mg BID\par Lantus 36 units nightly\par Humalog 5-8-10\par Humalog scale 151-200 2 units, 201-300 4 units, 301+ 6\par \par SMBG: testing 4x daily, logs scanned in. denies lows, fasting 110-140, lunch 100-150, dinner , -180\par \par Diet: trying to watch diet, eats late at night\par Exercise: not at this time, torn rotator cuff and Left ankle sprain\par \par Complications:\par 8/20/20 eye exam - (+) DR\par 4/2020 neg urine microalb/Cr\par denies dysesthesias\par \par \par

## 2020-11-19 NOTE — REVIEW OF SYSTEMS
[Recent Weight Gain (___ Lbs)] : recent weight gain: [unfilled] lbs [Recent Weight Loss (___ Lbs)] : no recent weight loss [Blurred Vision] : no blurred vision [Chest Pain] : no chest pain [Shortness Of Breath] : no shortness of breath [SOB on Exertion] : no shortness of breath on exertion [Nausea] : no nausea [Abdominal Pain] : no abdominal pain [Polyuria] : no polyuria [Nocturia] : no nocturia [Pain/Numbness of Digits] : no pain/numbness of digits [Depression] : no depression [Anxiety] : no anxiety [Polydipsia] : no polydipsia

## 2020-11-19 NOTE — ASSESSMENT
[FreeTextEntry1] : 1. T2DM - improved A1c, some fasting hyperglycemia and HS hyperglycemia\par - cont Glimepiride BID, Kombiglyze BID\par - increase Lantus to 40 units, suggest Humalog 5-8-12 w meals\par - cont to test 4x daily and call with highs/lows, sends logs\par - yearly eye exam with ophthalmology\par -  B12 level okay on MFN, monitor yearly\par \par 2. HTN -  cont current BP meds\par \par 3. vit D def - increase supp to 2 caps daily\par

## 2020-11-19 NOTE — DATA REVIEWED
[FreeTextEntry1] : Labs 9.22.18\par Cr 0.91\par LDl chol 73\par HDl 52\par A1c 6.2%\par \par Labs 1/5/18\par Cr 0.85, GFR 67\par LDl chol 76, Tg 84\par A1c 7.3\par urine microalb/Cr 0\par \par Labs 4.6.19\par Gluc 106, A1c 7.1\par Cr 0.81, GFR 71\par LDL 66, HDL 55, Tg 68\par TSh 3.75\par \par Labs 7/6/19\par LDL 66, Tg 76, HDL 55\par A1c 7.1\par \par labs 10/5/19\par Gluc 162, A1c 7.2\par Cr 0.91, GFR 61\par LDL 71, HDL 50, Tg 96\par \par Labs 1/4/20\par Gluc 139, A1c 7.5\par Cr 0.84, GFR 67\par LDL 68, Tg 95, HDL 55\par \par Labs 8/8/20 A1c 7.2\par \par Labs 11/14/20\par Gluc 151, A1c 7%\par Cr 0.94, GFR 59\par LDL 62, HDL 55, Tg 78\par B12 532\par vit D 25\par urine microalb/Cr neg\par

## 2020-11-19 NOTE — PHYSICAL EXAM
[Alert] : alert [Well Nourished] : well nourished [Healthy Appearance] : healthy appearance [Obese] : obese [No Acute Distress] : no acute distress [EOMI] : extra ocular movement intact [No LAD] : no lymphadenopathy [Thyroid Not Enlarged] : the thyroid was not enlarged [No Thyroid Nodules] : no palpable thyroid nodules [No Respiratory Distress] : no respiratory distress [No Accessory Muscle Use] : no accessory muscle use [Clear to Auscultation] : lungs were clear to auscultation bilaterally [Normal S1, S2] : normal S1 and S2 [Normal Rate] : heart rate was normal [No Edema] : no peripheral edema [Normal Bowel Sounds] : normal bowel sounds [Not Tender] : non-tender [Soft] : abdomen soft [Normal Gait] : normal gait [Cranial Nerves Intact] : cranial nerves 2-12 were intact [Normal Reflexes] : deep tendon reflexes were 2+ and symmetric [No Tremors] : no tremors [Oriented x3] : oriented to person, place, and time [Normal Affect] : the affect was normal [Normal Insight/Judgement] : insight and judgment were intact [Normal Mood] : the mood was normal

## 2020-11-30 ENCOUNTER — NON-APPOINTMENT (OUTPATIENT)
Age: 70
End: 2020-11-30

## 2020-11-30 ENCOUNTER — APPOINTMENT (OUTPATIENT)
Dept: FAMILY MEDICINE | Facility: CLINIC | Age: 70
End: 2020-11-30
Payer: COMMERCIAL

## 2020-11-30 ENCOUNTER — TRANSCRIPTION ENCOUNTER (OUTPATIENT)
Age: 70
End: 2020-11-30

## 2020-11-30 VITALS
DIASTOLIC BLOOD PRESSURE: 80 MMHG | WEIGHT: 236 LBS | OXYGEN SATURATION: 95 % | RESPIRATION RATE: 14 BRPM | HEART RATE: 112 BPM | HEIGHT: 66 IN | SYSTOLIC BLOOD PRESSURE: 140 MMHG | BODY MASS INDEX: 37.93 KG/M2

## 2020-11-30 VITALS — SYSTOLIC BLOOD PRESSURE: 130 MMHG | DIASTOLIC BLOOD PRESSURE: 80 MMHG

## 2020-11-30 VITALS — TEMPERATURE: 97.2 F

## 2020-11-30 DIAGNOSIS — Z00.00 ENCOUNTER FOR GENERAL ADULT MEDICAL EXAMINATION W/OUT ABNORMAL FINDINGS: ICD-10-CM

## 2020-11-30 DIAGNOSIS — Z12.39 ENCOUNTER FOR OTHER SCREENING FOR MALIGNANT NEOPLASM OF BREAST: ICD-10-CM

## 2020-11-30 LAB
CREAT SPEC-SCNC: 57.57
MICROALBUMIN 24H UR DL<=1MG/L-MCNC: NORMAL
MICROALBUMIN/CREAT 24H UR-RTO: NORMAL

## 2020-11-30 PROCEDURE — 99397 PER PM REEVAL EST PAT 65+ YR: CPT | Mod: 25

## 2020-11-30 PROCEDURE — 90662 IIV NO PRSV INCREASED AG IM: CPT

## 2020-11-30 PROCEDURE — 93000 ELECTROCARDIOGRAM COMPLETE: CPT

## 2020-11-30 PROCEDURE — G0008: CPT

## 2020-11-30 NOTE — PLAN
[FreeTextEntry1] : she will continue healthy diet and medications, high dose flu vaccine was given, she will follow up in 3 months or sooner prn, mammogram rx was given, she refused to schedule a colonoscopy or do fecal occult blood testing

## 2020-11-30 NOTE — PHYSICAL EXAM
[No Acute Distress] : no acute distress [Well Nourished] : well nourished [Well Developed] : well developed [Well-Appearing] : well-appearing [Normal Voice/Communication] : normal voice/communication [Normal Sclera/Conjunctiva] : normal sclera/conjunctiva [Normal Outer Ear/Nose] : the outer ears and nose were normal in appearance [Normal TMs] : both tympanic membranes were normal [No Lymphadenopathy] : no lymphadenopathy [Supple] : supple [No Respiratory Distress] : no respiratory distress  [No Accessory Muscle Use] : no accessory muscle use [Clear to Auscultation] : lungs were clear to auscultation bilaterally [Normal Rate] : normal rate  [Regular Rhythm] : with a regular rhythm [Normal S1, S2] : normal S1 and S2 [No Murmur] : no murmur heard [No Carotid Bruits] : no carotid bruits [Soft] : abdomen soft [Non Tender] : non-tender [Non-distended] : non-distended [No HSM] : no HSM [Normal Bowel Sounds] : normal bowel sounds [Coordination Grossly Intact] : coordination grossly intact [No Focal Deficits] : no focal deficits [Speech Grossly Normal] : speech grossly normal [Memory Grossly Normal] : memory grossly normal [Normal Affect] : the affect was normal [Normal Mood] : the mood was normal [Normal Insight/Judgement] : insight and judgment were intact

## 2020-11-30 NOTE — HEALTH RISK ASSESSMENT
[Good] : ~his/her~  mood as  good [0-5] : 0-5 [No] : In the past 12 months have you used drugs other than those required for medical reasons? No [No falls in past year] : Patient reported no falls in the past year [Patient declined colonoscopy] : Patient declined colonoscopy [HIV test declined] : HIV test declined [Hepatitis C test declined] : Hepatitis C test declined [None] : None [With Family] : lives with family [Employed] : employed [] :  [Feels Safe at Home] : Feels safe at home [Fully functional (bathing, dressing, toileting, transferring, walking, feeding)] : Fully functional (bathing, dressing, toileting, transferring, walking, feeding) [Fully functional (using the telephone, shopping, preparing meals, housekeeping, doing laundry, using] : Fully functional and needs no help or supervision to perform IADLs (using the telephone, shopping, preparing meals, housekeeping, doing laundry, using transportation, managing medications and managing finances) [Reports normal functional visual acuity (ie: able to read med bottle)] : Reports normal functional visual acuity [Smoke Detector] : smoke detector [Carbon Monoxide Detector] : carbon monoxide detector [Safety elements used in home] : safety elements used in home [Seat Belt] :  uses seat belt [Sunscreen] : uses sunscreen [Patient/Caregiver not ready to engage] : Patient/Caregiver not ready to engage [FreeTextEntry1] : none [] : No [de-identified] : none [de-identified] : Dr Spencer (Endo), Dr Ferrer (Ophth) [de-identified] : walks the dogs [de-identified] : diabetic [Change in mental status noted] : No change in mental status noted [Language] : denies difficulty with language [Behavior] : denies difficulty with behavior [Learning/Retaining New Information] : denies difficulty learning/retaining new information [Handling Complex Tasks] : denies difficulty handling complex tasks [Reasoning] : denies difficulty with reasoning [Spatial Ability and Orientation] : denies difficulty with spatial ability and orientation [Sexually Active] : not sexually active [Reports changes in hearing] : Reports no changes in hearing [Reports changes in vision] : Reports no changes in vision [Reports changes in dental health] : Reports no changes in dental health [Guns at Home] : no guns at home [Travel to Developing Areas] : does not  travel to developing areas [TB Exposure] : is not being exposed to tuberculosis [Caregiver Concerns] : does not have caregiver concerns [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

## 2020-11-30 NOTE — HISTORY OF PRESENT ILLNESS
[FreeTextEntry1] : Patient presents for complete physical\par  [de-identified] : Dr Spencer increased her lantus dose, she eats late dinners.  She is due for a mammogram.

## 2020-12-21 ENCOUNTER — TRANSCRIPTION ENCOUNTER (OUTPATIENT)
Age: 70
End: 2020-12-21

## 2020-12-23 ENCOUNTER — RX RENEWAL (OUTPATIENT)
Age: 70
End: 2020-12-23

## 2021-01-05 ENCOUNTER — TRANSCRIPTION ENCOUNTER (OUTPATIENT)
Age: 71
End: 2021-01-05

## 2021-01-25 ENCOUNTER — TRANSCRIPTION ENCOUNTER (OUTPATIENT)
Age: 71
End: 2021-01-25

## 2021-01-28 NOTE — REVIEW OF SYSTEMS
With recent increase in symptoms secondary to psychosocial stressors.  Patient is currently on Pristiq 100 milligrams daily, Lamictal 100 milligrams daily, and Xanax 0.5 milligrams p.r.n..  Patient follows with [facility] and plans to discuss possible medication adjustments with them.   [Negative] : Heme/Lymph [FreeTextEntry9] : pain in right upper arm [de-identified] : bruising of right upper arm

## 2021-02-05 ENCOUNTER — NON-APPOINTMENT (OUTPATIENT)
Age: 71
End: 2021-02-05

## 2021-03-04 ENCOUNTER — RESULT CHARGE (OUTPATIENT)
Age: 71
End: 2021-03-04

## 2021-03-04 ENCOUNTER — APPOINTMENT (OUTPATIENT)
Dept: ENDOCRINOLOGY | Facility: CLINIC | Age: 71
End: 2021-03-04
Payer: COMMERCIAL

## 2021-03-04 VITALS
BODY MASS INDEX: 39.37 KG/M2 | HEART RATE: 87 BPM | WEIGHT: 245 LBS | HEIGHT: 66 IN | DIASTOLIC BLOOD PRESSURE: 96 MMHG | SYSTOLIC BLOOD PRESSURE: 170 MMHG | OXYGEN SATURATION: 95 % | TEMPERATURE: 97.6 F

## 2021-03-04 LAB
GLUCOSE BLDC GLUCOMTR-MCNC: 104
HBA1C MFR BLD HPLC: 6.8
LDLC SERPL DIRECT ASSAY-MCNC: 6.8
MICROALBUMIN/CREAT 24H UR-RTO: NORMAL

## 2021-03-04 PROCEDURE — 99072 ADDL SUPL MATRL&STAF TM PHE: CPT

## 2021-03-04 PROCEDURE — 82962 GLUCOSE BLOOD TEST: CPT

## 2021-03-04 PROCEDURE — 99214 OFFICE O/P EST MOD 30 MIN: CPT | Mod: 25

## 2021-03-04 NOTE — HISTORY OF PRESENT ILLNESS
[FreeTextEntry1] : Interval Hx- no issues, no changes. weight gain with insulin\par \par T2DM dx in 2017 with A1c 10.7% - she had been on Kombiglyze and MFN for several years prior to 2017 but reported that she did not know she had diabetes. \par reports worsening glucose over holidays\par does not want Riana sensor\par \par Current DM meds:\par Glimepiride 2 mg 1 BID\par Kombiglyze 2.5/1000 mg BID\par Lantus 40 units nightly\par Humalog 8-10 w meals, does not want to take more due to weight\par Humalog scale 151-200 2 units, 201-300 4 units, 301+ 6\par \par SMBG: testing 4x daily, logs scanned in. denies lows, good control 's depending on diet\par \par Diet: watching portions\par Exercise: not at this time, \par \par Complications:\par 8/20/20 eye exam - (+) DR\par 2021 neg urine microalb/Cr\par denies dysesthesias\par \par \par

## 2021-03-04 NOTE — ASSESSMENT
[FreeTextEntry1] : 1. T2DM - improved A1c, some fasting hyperglycemia and HS hyperglycemia. refuses to take more insulin due to weight gain\par - we disussed GLP1 agonis and SGLT2 inhibitor - reviewed risks/benefits and pt does not want to start either\par - cont Glimepiride BID, Kombiglyze BID\par - cont Lantus to 40 units,cont Humalog 8-10 w meals\par - cont to test 4x daily and call with highs/lows, sends logs\par - yearly eye exam with ophthalmology\par -  B12 level okay on MFN, monitor yearly\par \par 2. HTN -  cont acEI\par \par 3. vit D def - restart OTC supplement\par \par 4. obesity\par - discussed weight loss w lifestyle changes, diet and exercise\par - she does not want GLP1 agonis\par - she is not interested in bariatric surgery

## 2021-03-04 NOTE — DATA REVIEWED
[FreeTextEntry1] : Labs 9.22.18\par Cr 0.91\par LDl chol 73\par HDl 52\par A1c 6.2%\par \par Labs 1/5/18\par Cr 0.85, GFR 67\par LDl chol 76, Tg 84\par A1c 7.3\par urine microalb/Cr 0\par \par Labs 4.6.19\par Gluc 106, A1c 7.1\par Cr 0.81, GFR 71\par LDL 66, HDL 55, Tg 68\par TSh 3.75\par \par Labs 7/6/19\par LDL 66, Tg 76, HDL 55\par A1c 7.1\par \par labs 10/5/19\par Gluc 162, A1c 7.2\par Cr 0.91, GFR 61\par LDL 71, HDL 50, Tg 96\par \par Labs 1/4/20\par Gluc 139, A1c 7.5\par Cr 0.84, GFR 67\par LDL 68, Tg 95, HDL 55\par \par Labs 8/8/20 A1c 7.2\par \par Labs 11/14/20\par Gluc 151, A1c 7%\par Cr 0.94, GFR 59\par LDL 62, HDL 55, Tg 78\par B12 532\par vit D 25\par urine microalb/Cr neg\par \par Labs 2/20/21\par Gluc 103, A1c 6.8\par Cr 0.99, GFR 56\par HDL 53, Tg 61, 59\par TSH 3.00\par B12 571\par vit D 23\par neg urine alb/cr\par

## 2021-03-06 ENCOUNTER — APPOINTMENT (OUTPATIENT)
Dept: FAMILY MEDICINE | Facility: CLINIC | Age: 71
End: 2021-03-06
Payer: COMMERCIAL

## 2021-03-06 VITALS
WEIGHT: 245 LBS | TEMPERATURE: 97.2 F | BODY MASS INDEX: 39.37 KG/M2 | SYSTOLIC BLOOD PRESSURE: 140 MMHG | RESPIRATION RATE: 14 BRPM | HEART RATE: 98 BPM | HEIGHT: 66 IN | OXYGEN SATURATION: 98 % | DIASTOLIC BLOOD PRESSURE: 80 MMHG

## 2021-03-06 VITALS — DIASTOLIC BLOOD PRESSURE: 80 MMHG | SYSTOLIC BLOOD PRESSURE: 130 MMHG

## 2021-03-06 PROCEDURE — 99072 ADDL SUPL MATRL&STAF TM PHE: CPT

## 2021-03-06 PROCEDURE — 99213 OFFICE O/P EST LOW 20 MIN: CPT

## 2021-03-06 NOTE — PLAN
[FreeTextEntry1] : her hypertension is stable, I reviewed Dr Spencer's notes and the recent lab report, she will continue current antihypertensive treatment and increase activity as tolerated and follow up in 3 months or sooner prn

## 2021-03-06 NOTE — PHYSICAL EXAM
[No Acute Distress] : no acute distress [Well Nourished] : well nourished [Well Developed] : well developed [Well-Appearing] : well-appearing [Normal Voice/Communication] : normal voice/communication [Supple] : supple [No Respiratory Distress] : no respiratory distress  [No Accessory Muscle Use] : no accessory muscle use [Clear to Auscultation] : lungs were clear to auscultation bilaterally [Normal Rate] : normal rate  [Regular Rhythm] : with a regular rhythm [Normal S1, S2] : normal S1 and S2 [No Murmur] : no murmur heard [Coordination Grossly Intact] : coordination grossly intact [Normal Mood] : the mood was normal

## 2021-03-06 NOTE — HISTORY OF PRESENT ILLNESS
[FreeTextEntry1] : patient presents for blood pressure check  [de-identified] : She is slowly losing weight and just saw Dr Spencer to discuss the diabetes.  She is trying to stay active but is unable to walk vigorously or ride the stationary bicycle due to back pain

## 2021-04-05 ENCOUNTER — TRANSCRIPTION ENCOUNTER (OUTPATIENT)
Age: 71
End: 2021-04-05

## 2021-04-12 ENCOUNTER — TRANSCRIPTION ENCOUNTER (OUTPATIENT)
Age: 71
End: 2021-04-12

## 2021-05-04 RX ORDER — BLOOD-GLUCOSE METER
W/DEVICE KIT MISCELLANEOUS
Qty: 1 | Refills: 0 | Status: ACTIVE | COMMUNITY
Start: 2021-05-03 | End: 1900-01-01

## 2021-06-05 ENCOUNTER — APPOINTMENT (OUTPATIENT)
Dept: FAMILY MEDICINE | Facility: CLINIC | Age: 71
End: 2021-06-05
Payer: COMMERCIAL

## 2021-06-05 VITALS — DIASTOLIC BLOOD PRESSURE: 70 MMHG | SYSTOLIC BLOOD PRESSURE: 132 MMHG

## 2021-06-05 VITALS
RESPIRATION RATE: 14 BRPM | SYSTOLIC BLOOD PRESSURE: 142 MMHG | OXYGEN SATURATION: 98 % | HEART RATE: 84 BPM | TEMPERATURE: 97.3 F | BODY MASS INDEX: 39.37 KG/M2 | HEIGHT: 66 IN | WEIGHT: 245 LBS | DIASTOLIC BLOOD PRESSURE: 80 MMHG

## 2021-06-05 DIAGNOSIS — M54.16 RADICULOPATHY, LUMBAR REGION: ICD-10-CM

## 2021-06-05 PROCEDURE — 99213 OFFICE O/P EST LOW 20 MIN: CPT

## 2021-06-05 PROCEDURE — 99072 ADDL SUPL MATRL&STAF TM PHE: CPT

## 2021-06-05 NOTE — HISTORY OF PRESENT ILLNESS
[FreeTextEntry1] : pt presents for med follow up  [de-identified] : She has an itchy rash on her hands and then blisters form and they are painful.  She is using flonase for her season allergies and they are under good control.  She had her lab testing done this morning.  She is following the diabetic diet but isn't able to exercise well due to ongoing pain in her back, she has a history of herniated discs and did see pain management and had injections without improvement.

## 2021-06-05 NOTE — PLAN
[FreeTextEntry1] : she was advised to continue the clobetasol as needed and encouraged to continue to stay active, I will review the lab report when available, she will get a mammogram via the mobile unit through her employer and will follow up in 3 months or sooner prn

## 2021-06-05 NOTE — REVIEW OF SYSTEMS
[Joint Pain] : joint pain [Joint Stiffness] : joint stiffness [Muscle Pain] : muscle pain [Back Pain] : back pain [Itching] : Itching [Skin Rash] : skin rash [Negative] : Heme/Lymph

## 2021-06-05 NOTE — PHYSICAL EXAM
[No Acute Distress] : no acute distress [Well Nourished] : well nourished [Well Developed] : well developed [Well-Appearing] : well-appearing [Normal Voice/Communication] : normal voice/communication [Supple] : supple [No Respiratory Distress] : no respiratory distress  [No Accessory Muscle Use] : no accessory muscle use [Clear to Auscultation] : lungs were clear to auscultation bilaterally [Normal Rate] : normal rate  [Regular Rhythm] : with a regular rhythm [Normal S1, S2] : normal S1 and S2 [No Murmur] : no murmur heard [Coordination Grossly Intact] : coordination grossly intact [Normal Mood] : the mood was normal [de-identified] : healing vesicles on hands

## 2021-06-09 LAB
HBA1C MFR BLD HPLC: 6.7
LDLC SERPL DIRECT ASSAY-MCNC: 64
MICROALBUMIN/CREAT 24H UR-RTO: NORMAL

## 2021-06-10 ENCOUNTER — APPOINTMENT (OUTPATIENT)
Dept: ENDOCRINOLOGY | Facility: CLINIC | Age: 71
End: 2021-06-10
Payer: COMMERCIAL

## 2021-06-10 ENCOUNTER — RESULT CHARGE (OUTPATIENT)
Age: 71
End: 2021-06-10

## 2021-06-10 VITALS
SYSTOLIC BLOOD PRESSURE: 126 MMHG | BODY MASS INDEX: 39.7 KG/M2 | TEMPERATURE: 97.4 F | WEIGHT: 247 LBS | OXYGEN SATURATION: 97 % | HEART RATE: 85 BPM | DIASTOLIC BLOOD PRESSURE: 80 MMHG | HEIGHT: 66 IN

## 2021-06-10 LAB — GLUCOSE BLDC GLUCOMTR-MCNC: 83

## 2021-06-10 PROCEDURE — 99214 OFFICE O/P EST MOD 30 MIN: CPT | Mod: 25

## 2021-06-10 PROCEDURE — 82962 GLUCOSE BLOOD TEST: CPT

## 2021-06-10 PROCEDURE — 99072 ADDL SUPL MATRL&STAF TM PHE: CPT

## 2021-06-10 RX ORDER — PEN NEEDLE, DIABETIC 29 G X1/2"
31G X 5 MM NEEDLE, DISPOSABLE MISCELLANEOUS
Qty: 4 | Refills: 0 | Status: DISCONTINUED | COMMUNITY
Start: 2018-01-15 | End: 2021-06-10

## 2021-06-10 RX ORDER — MULTIVIT-MIN/IRON/FOLIC ACID/K 18-600-40
CAPSULE ORAL
Refills: 0 | Status: DISCONTINUED | COMMUNITY
End: 2021-06-10

## 2021-06-10 NOTE — ASSESSMENT
[FreeTextEntry1] : 1. T2DM - improved A1c, stable control on logs, try and reduce insulin due to weight gain\par - we disussed GLP1 agonist and SGLT2 inhibitor - reviewed risks/benefits and pt does not want to start either\par - cont Glimepiride BID, Kombiglyze BID\par - decrease Lantus to 30 units,cont Humalog 8-10 w meals\par - cont to test 4x daily and call with highs/lows, sends logs\par - yearly eye exam with ophthalmology\par -  B12 level okay on MFN, monitor yearly\par - repeat A1c 3 months\par \par 2. HTN -  cont ACEi\par \par 3. obesity\par - discussed weight loss w lifestyle changes, diet and exercise\par - she does not want GLP1 agonist\par - she is not interested in bariatric surgery\par \par 4. elevated LFT ?fatty liver, advised PCP follow up

## 2021-06-10 NOTE — HISTORY OF PRESENT ILLNESS
[FreeTextEntry1] : Interval Hx- no issues, no changes. icthy blisters on hand worse w frequent hand washing\par \par T2DM dx in 2017 with A1c 10.7% - she had been on Kombiglyze and MFN for several years prior to 2017 but reported that she did not know she had diabetes. \par does not want Riana sensor\par \par Current DM meds:\par Glimepiride 2 mg 1 BID\par Kombiglyze 2.5/1000 mg BID\par Lantus 40 units nightly\par Humalog 8-10 w meals, does not want to take more due to weight\par Humalog scale 151-200 2 units, 201-300 4 units, 301+ 6\par \par SMBG: testing 4x daily, logs scanned in. denies lows, 's,  at times due to diet\par \par Diet: watching portions\par Exercise: not at this time\par \par Complications:\par 8/20/20 eye exam - (+) DR\par 2021 neg urine microalb/Cr\par denies dysesthesias\par \par \par

## 2021-06-10 NOTE — PHYSICAL EXAM
[Alert] : alert [Well Nourished] : well nourished [Healthy Appearance] : healthy appearance [Obese] : obese [No Acute Distress] : no acute distress [EOMI] : extra ocular movement intact [No LAD] : no lymphadenopathy [Thyroid Not Enlarged] : the thyroid was not enlarged [No Thyroid Nodules] : no palpable thyroid nodules [No Respiratory Distress] : no respiratory distress [No Accessory Muscle Use] : no accessory muscle use [Clear to Auscultation] : lungs were clear to auscultation bilaterally [Normal S1, S2] : normal S1 and S2 [Normal Rate] : heart rate was normal [Normal Bowel Sounds] : normal bowel sounds [No Edema] : no peripheral edema [Not Tender] : non-tender [Soft] : abdomen soft [Normal Gait] : normal gait [Cranial Nerves Intact] : cranial nerves 2-12 were intact [Normal Reflexes] : deep tendon reflexes were 2+ and symmetric [No Tremors] : no tremors [Oriented x3] : oriented to person, place, and time [Normal Affect] : the affect was normal [Normal Insight/Judgement] : insight and judgment were intact [Normal Mood] : the mood was normal

## 2021-06-10 NOTE — REVIEW OF SYSTEMS
[Polyuria] : polyuria [Recent Weight Gain (___ Lbs)] : no recent weight gain [Recent Weight Loss (___ Lbs)] : no recent weight loss [Blurred Vision] : no blurred vision [Chest Pain] : no chest pain [Shortness Of Breath] : no shortness of breath [SOB on Exertion] : no shortness of breath on exertion [Nausea] : no nausea [Abdominal Pain] : no abdominal pain [Nocturia] : no nocturia [Pain/Numbness of Digits] : no pain/numbness of digits [Depression] : no depression [Anxiety] : no anxiety [Polydipsia] : no polydipsia

## 2021-06-10 NOTE — DATA REVIEWED
[FreeTextEntry1] : Labs 9.22.18\par Cr 0.91\par LDl chol 73\par HDl 52\par A1c 6.2%\par \par Labs 1/5/18\par Cr 0.85, GFR 67\par LDl chol 76, Tg 84\par A1c 7.3\par urine microalb/Cr 0\par \par Labs 4.6.19\par Gluc 106, A1c 7.1\par Cr 0.81, GFR 71\par LDL 66, HDL 55, Tg 68\par TSh 3.75\par \par Labs 7/6/19\par LDL 66, Tg 76, HDL 55\par A1c 7.1\par \par labs 10/5/19\par Gluc 162, A1c 7.2\par Cr 0.91, GFR 61\par LDL 71, HDL 50, Tg 96\par \par Labs 1/4/20\par Gluc 139, A1c 7.5\par Cr 0.84, GFR 67\par LDL 68, Tg 95, HDL 55\par \par Labs 8/8/20 A1c 7.2\par \par Labs 11/14/20\par Gluc 151, A1c 7%\par Cr 0.94, GFR 59\par LDL 62, HDL 55, Tg 78\par B12 532\par vit D 25\par urine microalb/Cr neg\par \par Labs 2/20/21\par Gluc 103, A1c 6.8\par Cr 0.99, GFR 56\par HDL 53, Tg 61, 59\par TSH 3.00\par B12 571\par vit D 23\par neg urine alb/cr\par \par Labs 6/5/21\par Gluc 80, A1c 6.7\par Tg 84, HDL 49, LDL 64\par AST 34, ALT 47\par TSH 2.89\par vit D 36\par urine alb/Cr neg\par

## 2021-06-28 ENCOUNTER — TRANSCRIPTION ENCOUNTER (OUTPATIENT)
Age: 71
End: 2021-06-28

## 2021-07-15 ENCOUNTER — TRANSCRIPTION ENCOUNTER (OUTPATIENT)
Age: 71
End: 2021-07-15

## 2021-07-28 ENCOUNTER — TRANSCRIPTION ENCOUNTER (OUTPATIENT)
Age: 71
End: 2021-07-28

## 2021-08-04 ENCOUNTER — TRANSCRIPTION ENCOUNTER (OUTPATIENT)
Age: 71
End: 2021-08-04

## 2021-08-05 ENCOUNTER — TRANSCRIPTION ENCOUNTER (OUTPATIENT)
Age: 71
End: 2021-08-05

## 2021-08-19 ENCOUNTER — TRANSCRIPTION ENCOUNTER (OUTPATIENT)
Age: 71
End: 2021-08-19

## 2021-09-01 ENCOUNTER — TRANSCRIPTION ENCOUNTER (OUTPATIENT)
Age: 71
End: 2021-09-01

## 2021-09-14 ENCOUNTER — TRANSCRIPTION ENCOUNTER (OUTPATIENT)
Age: 71
End: 2021-09-14

## 2021-09-16 ENCOUNTER — RESULT CHARGE (OUTPATIENT)
Age: 71
End: 2021-09-16

## 2021-09-16 ENCOUNTER — APPOINTMENT (OUTPATIENT)
Dept: ENDOCRINOLOGY | Facility: CLINIC | Age: 71
End: 2021-09-16
Payer: COMMERCIAL

## 2021-09-16 VITALS
TEMPERATURE: 98.3 F | DIASTOLIC BLOOD PRESSURE: 80 MMHG | HEART RATE: 81 BPM | OXYGEN SATURATION: 96 % | WEIGHT: 249 LBS | SYSTOLIC BLOOD PRESSURE: 126 MMHG | BODY MASS INDEX: 40.02 KG/M2 | HEIGHT: 66 IN

## 2021-09-16 DIAGNOSIS — E66.9 OBESITY, UNSPECIFIED: ICD-10-CM

## 2021-09-16 LAB
GLUCOSE BLDC GLUCOMTR-MCNC: 100
GLUCOSE BLDC GLUCOMTR-MCNC: 73

## 2021-09-16 PROCEDURE — 82962 GLUCOSE BLOOD TEST: CPT

## 2021-09-16 PROCEDURE — 99215 OFFICE O/P EST HI 40 MIN: CPT | Mod: 25

## 2021-09-16 NOTE — HISTORY OF PRESENT ILLNESS
[FreeTextEntry1] : Interval Hx- FS 73 - took humalog before lunch but did not eat much. no issues, no changes\par \par T2DM dx in 2017 with A1c 10.7% - she had been on Kombiglyze and MFN for several years prior to 2017 but reported that she did not know she had diabetes. \par does not want Riana sensor\par \par Current DM meds: declined  GLP1 agonist and SGLT2 inhibitor in past\par Glimepiride 2 mg 1 BID\par Kombiglyze 2.5/1000 mg BID\par Lantus 30 units nightly\par Humalog 10 w meals, does not want to take more due to weight\par Humalog scale 151-200 2 units, 201-300 4 units, 301+ 6\par \par SMBG: testing 4x daily, logs scanned in. Fs 120-150's, occ 200's due to diet\par \par Complications:\par 8/20/20 eye exam - (+) , has apt next month\par 2021 neg urine microalb/Cr\par denies dysesthesias\par \par \par

## 2021-09-16 NOTE — REVIEW OF SYSTEMS
[Polyuria] : polyuria [Recent Weight Gain (___ Lbs)] : recent weight gain: [unfilled] lbs [SOB on Exertion] : shortness of breath on exertion [Recent Weight Loss (___ Lbs)] : no recent weight loss [Blurred Vision] : no blurred vision [Chest Pain] : no chest pain [Shortness Of Breath] : no shortness of breath [Nausea] : no nausea [Abdominal Pain] : no abdominal pain [Nocturia] : no nocturia [Depression] : no depression [Pain/Numbness of Digits] : no pain/numbness of digits [Anxiety] : no anxiety [Polydipsia] : no polydipsia

## 2021-09-16 NOTE — ASSESSMENT
[FreeTextEntry1] : 1. T2DM w retinopathy- slight loss of control on review of logs after eating out more. Hypoglycemia at visit todau\par - pt given juice/cookies and remained in office until sugars improved\par - cont Glimepiride BID, Kombiglyze BID\par - increase Lantus to 38 units,cont Humalog 8-10 w meals\par - cont to test 4x daily and call with highs/lows, sends logs\par - yearly eye exam with ophthalmology\par -  B12 level okay on MFN, monitor yearly\par - repeat A1c 3 months, labs done this week and results pending\par \par 2. HTN -  cont ACEi\par \par 3. obesity\par - discussed weight loss w lifestyle changes, diet and exercise\par - she does not want meds at this time\par - she is not interested in bariatric surgery\par \par 4. elevated LFT ?fatty liver, advised PCP follow up\par \par 5. PÉREZ - ?due to weight, may need to see cardio

## 2021-09-21 ENCOUNTER — NON-APPOINTMENT (OUTPATIENT)
Age: 71
End: 2021-09-21

## 2021-10-15 ENCOUNTER — NON-APPOINTMENT (OUTPATIENT)
Age: 71
End: 2021-10-15

## 2021-10-23 ENCOUNTER — APPOINTMENT (OUTPATIENT)
Dept: FAMILY MEDICINE | Facility: CLINIC | Age: 71
End: 2021-10-23
Payer: COMMERCIAL

## 2021-10-23 VITALS — SYSTOLIC BLOOD PRESSURE: 140 MMHG | DIASTOLIC BLOOD PRESSURE: 70 MMHG

## 2021-10-23 VITALS
HEART RATE: 96 BPM | DIASTOLIC BLOOD PRESSURE: 90 MMHG | WEIGHT: 241 LBS | SYSTOLIC BLOOD PRESSURE: 150 MMHG | BODY MASS INDEX: 38.73 KG/M2 | OXYGEN SATURATION: 98 % | TEMPERATURE: 97.3 F | HEIGHT: 66 IN | RESPIRATION RATE: 15 BRPM

## 2021-10-23 DIAGNOSIS — M25.50 PAIN IN UNSPECIFIED JOINT: ICD-10-CM

## 2021-10-23 PROCEDURE — 90662 IIV NO PRSV INCREASED AG IM: CPT

## 2021-10-23 PROCEDURE — G0008: CPT

## 2021-10-23 PROCEDURE — 99214 OFFICE O/P EST MOD 30 MIN: CPT | Mod: 25

## 2021-10-23 NOTE — PLAN
[FreeTextEntry1] : the lab report including CMP, lipid panel, A1C and Vitamin D and the endocrinology notes were reviewed, her hypertension isn't under optimal control but will continue current treatment for now and will reassess at the next office visit, she was advised to try to minimize her use of OTC analgesics, she will go for the mammogram, high dose flu vaccine was given and she will follow up in 3 months or sooner prn

## 2021-10-23 NOTE — HISTORY OF PRESENT ILLNESS
[FreeTextEntry1] : Patient presents for a for a follow up  [de-identified] : Her last labs from September showed that her liver function tests were elevated.  She does take advil several times per week.  She only drinks alcohol once a year on [employer] Patrick's Day.  She had an appointment for her mammogram but it was cancelled and she is rescheduled, she [facility] it [facility]

## 2021-12-08 LAB
HBA1C MFR BLD HPLC: 7.1
LDLC SERPL DIRECT ASSAY-MCNC: 71

## 2021-12-09 ENCOUNTER — RESULT CHARGE (OUTPATIENT)
Age: 71
End: 2021-12-09

## 2021-12-09 ENCOUNTER — APPOINTMENT (OUTPATIENT)
Dept: ENDOCRINOLOGY | Facility: CLINIC | Age: 71
End: 2021-12-09
Payer: COMMERCIAL

## 2021-12-09 VITALS
BODY MASS INDEX: 40.5 KG/M2 | HEART RATE: 94 BPM | DIASTOLIC BLOOD PRESSURE: 70 MMHG | HEIGHT: 66 IN | SYSTOLIC BLOOD PRESSURE: 132 MMHG | WEIGHT: 252 LBS

## 2021-12-09 LAB — GLUCOSE BLDC GLUCOMTR-MCNC: 97

## 2021-12-09 PROCEDURE — 99214 OFFICE O/P EST MOD 30 MIN: CPT | Mod: 25

## 2021-12-09 PROCEDURE — 82962 GLUCOSE BLOOD TEST: CPT

## 2021-12-09 NOTE — REVIEW OF SYSTEMS
[Recent Weight Gain (___ Lbs)] : recent weight gain: [unfilled] lbs [SOB on Exertion] : shortness of breath on exertion [Polyuria] : polyuria [Blurred Vision] : no blurred vision [Chest Pain] : no chest pain [Shortness Of Breath] : no shortness of breath [Nausea] : no nausea [Abdominal Pain] : no abdominal pain [Nocturia] : no nocturia [Pain/Numbness of Digits] : no pain/numbness of digits [Depression] : no depression [Anxiety] : no anxiety [Polydipsia] : no polydipsia

## 2021-12-09 NOTE — HISTORY OF PRESENT ILLNESS
[FreeTextEntry1] : Interval Hx- meter issues, keeps getting  past 4 check\par \par T2DM dx in 2017 with A1c 10.7% - she had been on Kombiglyze and MFN for several years prior to 2017\par does not want Riana sensor\par \par Current DM meds: declined  GLP1 agonist and SGLT2 inhibitor in past\par Glimepiride 2 mg 1 tab BID\par Kombiglyze 2.5/1000 mg BID\par Lantus 38 units nightly\par Humalog 10 w meals, does not want to take more due to weight\par Humalog scale 151-200 2 units, 201-300 4 units, 301+ 6\par \par SMBG: testing 4x daily, logs scanned in. FS controlled 's; no lows\par \par Complications:\par 2021 eye exam - (+) stable DR by history\par 2021 neg urine microalb/Cr\par denies dysesthesias\par \par \par

## 2021-12-09 NOTE — DATA REVIEWED
[FreeTextEntry1] : Labs 9.22.18\par Cr 0.91\par LDl chol 73\par HDl 52\par A1c 6.2%\par \par Labs 1/5/18\par Cr 0.85, GFR 67\par LDl chol 76, Tg 84\par A1c 7.3\par urine microalb/Cr 0\par \par Labs 4.6.19\par Gluc 106, A1c 7.1\par Cr 0.81, GFR 71\par LDL 66, HDL 55, Tg 68\par TSh 3.75\par \par Labs 7/6/19\par LDL 66, Tg 76, HDL 55\par A1c 7.1\par \par labs 10/5/19\par Gluc 162, A1c 7.2\par Cr 0.91, GFR 61\par LDL 71, HDL 50, Tg 96\par \par Labs 1/4/20\par Gluc 139, A1c 7.5\par Cr 0.84, GFR 67\par LDL 68, Tg 95, HDL 55\par \par Labs 8/8/20 A1c 7.2\par \par Labs 11/14/20\par Gluc 151, A1c 7%\par Cr 0.94, GFR 59\par LDL 62, HDL 55, Tg 78\par B12 532\par vit D 25\par urine microalb/Cr neg\par \par Labs 2/20/21\par Gluc 103, A1c 6.8\par Cr 0.99, GFR 56\par HDL 53, Tg 61, 59\par TSH 3.00\par B12 571\par vit D 23\par neg urine alb/cr\par \par Labs 6/5/21\par Gluc 80, A1c 6.7\par Tg 84, HDL 49, LDL 64\par AST 34, ALT 47\par TSH 2.89\par vit D 36\par urine alb/Cr neg\par \par "Labs 9/11/21 reviewed\par A1c 7.1 and LDL chol 71 - good, cont current meds\par AST 34, ALT 37 - mildly elevated, f/u PCP"\par \par labs 12/1/21\par A1c 7.2\par LDL 71\par AST 35, ALT 39\par

## 2021-12-09 NOTE — ASSESSMENT
[FreeTextEntry1] : 1. T2DM w retinopathy- stable control\par - cont Glimepiride BID, Kombiglyze BID\par - cont Lantus to 38 units,cont Humalog 8-10 w meals\par - cont to test 4x daily and call with highs/lows, sends logs\par - yearly eye exam with ophthalmology\par -  B12 level okay on MFN, monitor yearly\par - repeat A1c 3 months\par \par 2. HTN -  cont ACEi\par \par 3. obesity\par - discussed weight loss w lifestyle changes, diet and exercise\par - she does not want meds at this time\par - she is not interested in bariatric surgery\par - may not be candidate for GLP1 agonist due to retinopathy\par \par 4. elevated LFT ?fatty liver, discussed tx w pioglitazone - pt declined at this time, will revisit this at next visit\par

## 2021-12-30 ENCOUNTER — TRANSCRIPTION ENCOUNTER (OUTPATIENT)
Age: 71
End: 2021-12-30

## 2022-01-04 ENCOUNTER — TRANSCRIPTION ENCOUNTER (OUTPATIENT)
Age: 72
End: 2022-01-04

## 2022-01-15 ENCOUNTER — TRANSCRIPTION ENCOUNTER (OUTPATIENT)
Age: 72
End: 2022-01-15

## 2022-01-22 ENCOUNTER — APPOINTMENT (OUTPATIENT)
Dept: FAMILY MEDICINE | Facility: CLINIC | Age: 72
End: 2022-01-22
Payer: COMMERCIAL

## 2022-01-22 VITALS
HEART RATE: 87 BPM | HEIGHT: 66 IN | DIASTOLIC BLOOD PRESSURE: 70 MMHG | WEIGHT: 252 LBS | OXYGEN SATURATION: 98 % | RESPIRATION RATE: 17 BRPM | SYSTOLIC BLOOD PRESSURE: 120 MMHG | BODY MASS INDEX: 40.5 KG/M2

## 2022-01-22 DIAGNOSIS — L30.9 DERMATITIS, UNSPECIFIED: ICD-10-CM

## 2022-01-22 PROCEDURE — 99214 OFFICE O/P EST MOD 30 MIN: CPT

## 2022-01-22 NOTE — PHYSICAL EXAM
[No Acute Distress] : no acute distress [Well Nourished] : well nourished [Well Developed] : well developed [Well-Appearing] : well-appearing [Normal Voice/Communication] : normal voice/communication [No Respiratory Distress] : no respiratory distress  [No Accessory Muscle Use] : no accessory muscle use [Clear to Auscultation] : lungs were clear to auscultation bilaterally [Normal Rate] : normal rate  [Regular Rhythm] : with a regular rhythm [Normal S1, S2] : normal S1 and S2 [Normal Mood] : the mood was normal [de-identified] : faint patches of erythema on forearms

## 2022-01-22 NOTE — HISTORY OF PRESENT ILLNESS
[FreeTextEntry1] : pt presents for med follow up  [de-identified] : She had a bad rash on her face and neck, she saw the dermatologist who didn't know what it was, it went away with a rx cream.  She had been using a new face mask at work that had a funny odor and is no longer using it.  She also has a cream for a rash on her arms but doesn't know the name.  She had an eye exam and reports that there weren't any problems related to the diabetes

## 2022-01-22 NOTE — PLAN
[FreeTextEntry1] : we will obtain a copy of her most recent eye exam, she will continue current tx, the endocrinology notes were reviewed and she will follow up in 3 months or sooner prn, she is refusing to schedule a mammogram at this time

## 2022-02-01 ENCOUNTER — TRANSCRIPTION ENCOUNTER (OUTPATIENT)
Age: 72
End: 2022-02-01

## 2022-03-08 ENCOUNTER — TRANSCRIPTION ENCOUNTER (OUTPATIENT)
Age: 72
End: 2022-03-08

## 2022-03-23 LAB
HBA1C MFR BLD HPLC: 7.5
LDLC SERPL DIRECT ASSAY-MCNC: 67
MICROALBUMIN/CREAT 24H UR-RTO: NORMAL

## 2022-03-24 ENCOUNTER — RESULT CHARGE (OUTPATIENT)
Age: 72
End: 2022-03-24

## 2022-03-24 ENCOUNTER — APPOINTMENT (OUTPATIENT)
Dept: ENDOCRINOLOGY | Facility: CLINIC | Age: 72
End: 2022-03-24
Payer: COMMERCIAL

## 2022-03-24 VITALS
HEIGHT: 66 IN | SYSTOLIC BLOOD PRESSURE: 130 MMHG | WEIGHT: 254 LBS | HEART RATE: 89 BPM | BODY MASS INDEX: 40.82 KG/M2 | DIASTOLIC BLOOD PRESSURE: 80 MMHG

## 2022-03-24 LAB — GLUCOSE BLDC GLUCOMTR-MCNC: 113

## 2022-03-24 PROCEDURE — 99214 OFFICE O/P EST MOD 30 MIN: CPT | Mod: 25

## 2022-03-24 PROCEDURE — 82962 GLUCOSE BLOOD TEST: CPT

## 2022-03-24 NOTE — PHYSICAL EXAM
[Alert] : alert [Obese] : obese [EOMI] : extra ocular movement intact [No LAD] : no lymphadenopathy [Thyroid Not Enlarged] : the thyroid was not enlarged [No Thyroid Nodules] : no palpable thyroid nodules [No Accessory Muscle Use] : no accessory muscle use [Clear to Auscultation] : lungs were clear to auscultation bilaterally [Normal S1, S2] : normal S1 and S2 [Normal Rate] : heart rate was normal [No Edema] : no peripheral edema [Not Tender] : non-tender [Soft] : abdomen soft [Oriented x3] : oriented to person, place, and time [Normal Affect] : the affect was normal [Normal Insight/Judgement] : insight and judgment were intact [Normal Mood] : the mood was normal

## 2022-03-24 NOTE — DATA REVIEWED
[FreeTextEntry1] : Labs 9.22.18\par Cr 0.91\par LDl chol 73\par HDl 52\par A1c 6.2%\par \par Labs 1/5/18\par Cr 0.85, GFR 67\par LDl chol 76, Tg 84\par A1c 7.3\par urine microalb/Cr 0\par \par Labs 4.6.19\par Gluc 106, A1c 7.1\par Cr 0.81, GFR 71\par LDL 66, HDL 55, Tg 68\par TSh 3.75\par \par Labs 7/6/19\par LDL 66, Tg 76, HDL 55\par A1c 7.1\par \par labs 10/5/19\par Gluc 162, A1c 7.2\par Cr 0.91, GFR 61\par LDL 71, HDL 50, Tg 96\par \par Labs 1/4/20\par Gluc 139, A1c 7.5\par Cr 0.84, GFR 67\par LDL 68, Tg 95, HDL 55\par \par Labs 8/8/20 A1c 7.2\par \par Labs 11/14/20\par Gluc 151, A1c 7%\par Cr 0.94, GFR 59\par LDL 62, HDL 55, Tg 78\par B12 532\par vit D 25\par urine microalb/Cr neg\par \par Labs 2/20/21\par Gluc 103, A1c 6.8\par Cr 0.99, GFR 56\par HDL 53, Tg 61, 59\par TSH 3.00\par B12 571\par vit D 23\par neg urine alb/cr\par \par Labs 6/5/21\par Gluc 80, A1c 6.7\par Tg 84, HDL 49, LDL 64\par AST 34, ALT 47\par TSH 2.89\par vit D 36\par urine alb/Cr neg\par \par "Labs 9/11/21 reviewed\par A1c 7.1 and LDL chol 71 - good, cont current meds\par AST 34, ALT 37 - mildly elevated, f/u PCP"\par \par labs 12/1/21\par A1c 7.2\par LDL 71\par AST 35, ALT 39\par \par Labs 3/19/22\par Gluc 160, A1c 7.5\par Cr 0.99, GFR 55\par AST 36, ALT 43\par Tg 75, HDL 54, LDL 67\par CBC wnl\par urine alb/Cr neg

## 2022-03-24 NOTE — ASSESSMENT
[FreeTextEntry1] : 1. T2DM w retinopathy- stable but suboptimal control\par - cont Glimepiride BID, Kombiglyze BID\par - cont Lantus to 38 units,cont Humalog 8-10 w meals\par - try Jardiance 25 mg dailys, risks/benefits discussed,samples given\par - cont to test 4x daily and call with highs/lows, sends logs\par - yearly eye exam with ophthalmology\par -  B12 level okay on MFN, monitor yearly\par - repeat A1c 3 months\par \par 2. HTN -  cont ACEi\par \par 3. obesity\par - discussed weight loss w lifestyle changes, diet and exercise\par - she does not want meds at this time\par - she is not interested in bariatric surgery\par \par 4. elevated LFT ?fatty liver- counsled pt on weight loss\par

## 2022-03-24 NOTE — HISTORY OF PRESENT ILLNESS
[FreeTextEntry1] : Interval Hx- no issues, no changes\par \par T2DM dx in 2017 with A1c 10.7% - she had been on Kombiglyze and MFN for several years prior to 2017\par does not want Riana sensor, worsening due to diet\par \par Current DM meds: declined GLP1 agonist and SGLT2 inhibitor in past\par Glimepiride 2 mg 1 tab BID\par Kombiglyze 2.5/1000 mg BID\par Lantus 38 units nightly\par Humalog 10 w meals, does not want to take more due to weight\par Humalog scale 151-200 2 units, 201-300 4 units, 301+ 6\par \par SMBG: testing 4x daily, logs scanned in. FS controlled 's\par \par Complications:\par 2022 eye exam - (+) stable DR by history\par 2022 neg urine microalb/Cr\par denies dysesthesias\par \par \par

## 2022-03-24 NOTE — REVIEW OF SYSTEMS
[Blurred Vision] : blurred vision [SOB on Exertion] : shortness of breath on exertion [Polyuria] : polyuria [Recent Weight Gain (___ Lbs)] : no recent weight gain [Recent Weight Loss (___ Lbs)] : no recent weight loss [Chest Pain] : no chest pain [Shortness Of Breath] : no shortness of breath [Nausea] : no nausea [Abdominal Pain] : no abdominal pain [Nocturia] : no nocturia [Depression] : no depression [Polydipsia] : no polydipsia

## 2022-03-31 ENCOUNTER — TRANSCRIPTION ENCOUNTER (OUTPATIENT)
Age: 72
End: 2022-03-31

## 2022-04-25 ENCOUNTER — APPOINTMENT (OUTPATIENT)
Dept: FAMILY MEDICINE | Facility: CLINIC | Age: 72
End: 2022-04-25
Payer: COMMERCIAL

## 2022-04-25 VITALS
DIASTOLIC BLOOD PRESSURE: 82 MMHG | OXYGEN SATURATION: 98 % | RESPIRATION RATE: 17 BRPM | HEIGHT: 66 IN | SYSTOLIC BLOOD PRESSURE: 130 MMHG | HEART RATE: 98 BPM | TEMPERATURE: 98.3 F | BODY MASS INDEX: 40.82 KG/M2 | WEIGHT: 254 LBS

## 2022-04-25 VITALS — DIASTOLIC BLOOD PRESSURE: 80 MMHG | SYSTOLIC BLOOD PRESSURE: 126 MMHG

## 2022-04-25 DIAGNOSIS — L20.9 ATOPIC DERMATITIS, UNSPECIFIED: ICD-10-CM

## 2022-04-25 PROCEDURE — 99214 OFFICE O/P EST MOD 30 MIN: CPT

## 2022-04-25 RX ORDER — TRIAMCINOLONE ACETONIDE 5 MG/G
CREAM TOPICAL
Refills: 0 | Status: ACTIVE | COMMUNITY

## 2022-04-25 RX ORDER — DESONIDE 0.5 MG/G
0.05 CREAM TOPICAL
Refills: 0 | Status: ACTIVE | COMMUNITY

## 2022-04-25 NOTE — HISTORY OF PRESENT ILLNESS
[FreeTextEntry1] : Patient presents for med check [de-identified] : She was started on Jardiance and has noticed that she has had itching of her whole body and she was urinating very often and doesn't want to stay on it.  She denies any other health changes.

## 2022-04-25 NOTE — PHYSICAL EXAM
[No Acute Distress] : no acute distress [Well Nourished] : well nourished [Well Developed] : well developed [Well-Appearing] : well-appearing [Normal Voice/Communication] : normal voice/communication [No Respiratory Distress] : no respiratory distress  [No Accessory Muscle Use] : no accessory muscle use [Clear to Auscultation] : lungs were clear to auscultation bilaterally [Normal Rate] : normal rate  [Regular Rhythm] : with a regular rhythm [Normal S1, S2] : normal S1 and S2 [Coordination Grossly Intact] : coordination grossly intact [Normal Mood] : the mood was normal [de-identified] : slight erythema on left ear and forehead

## 2022-04-25 NOTE — PLAN
[FreeTextEntry1] : she was advised that the increased itching may be due to the Jardiance and she will speak with Dr Spencer about it, the most recent lab report including CMP, lipids, CBC, A1C and urine testing as well as the endocrinology notes were reviewed, she will follow up in 3 months or sooner prn

## 2022-04-29 ENCOUNTER — NON-APPOINTMENT (OUTPATIENT)
Age: 72
End: 2022-04-29

## 2022-06-01 ENCOUNTER — TRANSCRIPTION ENCOUNTER (OUTPATIENT)
Age: 72
End: 2022-06-01

## 2022-06-02 ENCOUNTER — TRANSCRIPTION ENCOUNTER (OUTPATIENT)
Age: 72
End: 2022-06-02

## 2022-06-21 ENCOUNTER — TRANSCRIPTION ENCOUNTER (OUTPATIENT)
Age: 72
End: 2022-06-21

## 2022-06-22 LAB
HBA1C MFR BLD HPLC: 7.2
LDLC SERPL DIRECT ASSAY-MCNC: 56

## 2022-06-23 ENCOUNTER — APPOINTMENT (OUTPATIENT)
Dept: ENDOCRINOLOGY | Facility: CLINIC | Age: 72
End: 2022-06-23
Payer: COMMERCIAL

## 2022-06-23 ENCOUNTER — RESULT CHARGE (OUTPATIENT)
Age: 72
End: 2022-06-23

## 2022-06-23 VITALS
BODY MASS INDEX: 41.46 KG/M2 | DIASTOLIC BLOOD PRESSURE: 80 MMHG | SYSTOLIC BLOOD PRESSURE: 130 MMHG | HEART RATE: 90 BPM | HEIGHT: 66 IN | WEIGHT: 258 LBS

## 2022-06-23 LAB
GLUCOSE BLDC GLUCOMTR-MCNC: 71
GLUCOSE BLDC GLUCOMTR-MCNC: 86

## 2022-06-23 PROCEDURE — 99214 OFFICE O/P EST MOD 30 MIN: CPT | Mod: 25

## 2022-06-23 PROCEDURE — 82962 GLUCOSE BLOOD TEST: CPT

## 2022-06-23 NOTE — DATA REVIEWED
[FreeTextEntry1] : Labs 9.22.18\par Cr 0.91\par LDl chol 73\par HDl 52\par A1c 6.2%\par \par Labs 1/5/18\par Cr 0.85, GFR 67\par LDl chol 76, Tg 84\par A1c 7.3\par urine microalb/Cr 0\par \par Labs 4.6.19\par Gluc 106, A1c 7.1\par Cr 0.81, GFR 71\par LDL 66, HDL 55, Tg 68\par TSh 3.75\par \par Labs 7/6/19\par LDL 66, Tg 76, HDL 55\par A1c 7.1\par \par labs 10/5/19\par Gluc 162, A1c 7.2\par Cr 0.91, GFR 61\par LDL 71, HDL 50, Tg 96\par \par Labs 1/4/20\par Gluc 139, A1c 7.5\par Cr 0.84, GFR 67\par LDL 68, Tg 95, HDL 55\par \par Labs 8/8/20 A1c 7.2\par \par Labs 11/14/20\par Gluc 151, A1c 7%\par Cr 0.94, GFR 59\par LDL 62, HDL 55, Tg 78\par B12 532\par vit D 25\par urine microalb/Cr neg\par \par Labs 2/20/21\par Gluc 103, A1c 6.8\par Cr 0.99, GFR 56\par HDL 53, Tg 61, 59\par TSH 3.00\par B12 571\par vit D 23\par neg urine alb/cr\par \par Labs 6/5/21\par Gluc 80, A1c 6.7\par Tg 84, HDL 49, LDL 64\par AST 34, ALT 47\par TSH 2.89\par vit D 36\par urine alb/Cr neg\par \par "Labs 9/11/21 reviewed\par A1c 7.1 and LDL chol 71 - good, cont current meds\par AST 34, ALT 37 - mildly elevated, f/u PCP"\par \par labs 12/1/21\par A1c 7.2\par LDL 71\par AST 35, ALT 39\par \par Labs 3/19/22\par Gluc 160, A1c 7.5\par Cr 0.99, GFR 55\par AST 36, ALT 43\par Tg 75, HDL 54, LDL 67\par CBC wnl\par urine alb/Cr neg\par \par Labs 6/18/22\par Gluc 163, A1c 7.2\par Cr 0.98, GFR 56\par AST 33 ALT 41\par Tg 89, HDL 52, LDL 56

## 2022-06-23 NOTE — REVIEW OF SYSTEMS
[Recent Weight Gain (___ Lbs)] : recent weight gain: [unfilled] lbs [Blurred Vision] : no blurred vision [Chest Pain] : no chest pain [Shortness Of Breath] : no shortness of breath [Nausea] : no nausea [Abdominal Pain] : no abdominal pain [Polyuria] : no polyuria [Nocturia] : no nocturia [Polydipsia] : no polydipsia [FreeTextEntry8] : overactive bladder

## 2022-06-23 NOTE — HISTORY OF PRESENT ILLNESS
[FreeTextEntry1] : Interval Hx-  tried jardiance sampled but did not tolerate it - vaginal rash, excessive urination, bladder cramps\par \par T2DM dx in 2017 with A1c 10.7% - she had been on Kombiglyze and MFN for several years prior to 2017\par does not want Riana sensor, worsening due to diet\par \par Current DM meds: \par Glimepiride 2 mg 1 tab BID\par Kombiglyze 2.5/1000 mg BID\par Lantus 38 units nightly\par Humalog 10 w meals, does not want to take more due to weight\par Humalog scale 151-200 2 units, 201-300 4 units, 301+ 6\par \par SMBG: testing 4x daily, logs scanned in. FS controlled 's\par \par Complications:\par 2022 eye exam - (+) stable DR by history\par 2022 neg urine microalb/Cr\par denies dysesthesias\par \par \par

## 2022-06-23 NOTE — PHYSICAL EXAM
[Alert] : alert [Obese] : obese [No LAD] : no lymphadenopathy [No Thyroid Nodules] : no palpable thyroid nodules [Clear to Auscultation] : lungs were clear to auscultation bilaterally [Normal S1, S2] : normal S1 and S2 [Normal Rate] : heart rate was normal [No Edema] : no peripheral edema [Not Tender] : non-tender [Soft] : abdomen soft [Oriented x3] : oriented to person, place, and time [Normal Affect] : the affect was normal [Normal Insight/Judgement] : insight and judgment were intact [Normal Mood] : the mood was normal [de-identified] : (+) vericose veins

## 2022-06-23 NOTE — ASSESSMENT
[FreeTextEntry1] : 1. T2DM w retinopathy- good control\par - cont Glimepiride BID, Kombiglyze BID\par - cont Lantus to 38 units,cont Humalog 8-10 w meals\par - intolerant to SGKT2 inhibitor, does not want GLP1 agonist after discussion of risks/benefits\par - cont to test 4x daily and call with highs/lows\par - yearly eye exam with ophthalmology\par -  B12 level okay on MFN, monitor yearly\par - repeat A1c 3 months\par \par 2. HTN -  cont ACEi\par \par 3. obesity\par - discussed weight loss w lifestyle changes, diet and exercise\par - she does not want GLP1 agonist at this time\par - she is not interested in bariatric surgery\par \par 4. elevated LFT ?fatty liver- counsled pt on weight loss, PCP f/u\par \par 5., mild hypoglycemia in office, given juice, pt remained in office until glucose improved\par

## 2022-07-09 ENCOUNTER — TRANSCRIPTION ENCOUNTER (OUTPATIENT)
Age: 72
End: 2022-07-09

## 2022-07-25 ENCOUNTER — APPOINTMENT (OUTPATIENT)
Dept: FAMILY MEDICINE | Facility: CLINIC | Age: 72
End: 2022-07-25

## 2022-07-25 VITALS — DIASTOLIC BLOOD PRESSURE: 72 MMHG | SYSTOLIC BLOOD PRESSURE: 132 MMHG

## 2022-07-25 VITALS
HEART RATE: 70 BPM | OXYGEN SATURATION: 98 % | SYSTOLIC BLOOD PRESSURE: 136 MMHG | WEIGHT: 258 LBS | BODY MASS INDEX: 39.1 KG/M2 | RESPIRATION RATE: 12 BRPM | DIASTOLIC BLOOD PRESSURE: 80 MMHG | HEIGHT: 68 IN

## 2022-07-25 VITALS — TEMPERATURE: 96.6 F

## 2022-07-25 PROCEDURE — 99214 OFFICE O/P EST MOD 30 MIN: CPT

## 2022-07-25 NOTE — PHYSICAL EXAM
[No Acute Distress] : no acute distress [Well Nourished] : well nourished [Well Developed] : well developed [Well-Appearing] : well-appearing [Normal Voice/Communication] : normal voice/communication [No Respiratory Distress] : no respiratory distress  [No Accessory Muscle Use] : no accessory muscle use [Clear to Auscultation] : lungs were clear to auscultation bilaterally [Normal Rate] : normal rate  [Regular Rhythm] : with a regular rhythm [Normal S1, S2] : normal S1 and S2 [No Spinal Tenderness] : no spinal tenderness [Normal Mood] : the mood was normal

## 2022-07-25 NOTE — PLAN
[FreeTextEntry1] : she was advised that the symptoms may be due to Dupixent and to speak with the dermatologist, the most recent endocrinology report and lab report was reviewed, hypertension is stable and she will follow up in 3 months or sooner prn

## 2022-07-25 NOTE — HISTORY OF PRESENT ILLNESS
[FreeTextEntry1] : pt presents for med follow up  [de-identified] : Her dermatologist just started her on Dupixent for the eczema but she has had severe pain in the right knee and hip since starting it.  She is taking Advil for the pain.  The pain is much worse at night and she isn't sleeping well.  She denies any other health changes

## 2022-07-27 ENCOUNTER — NON-APPOINTMENT (OUTPATIENT)
Age: 72
End: 2022-07-27

## 2022-07-27 DIAGNOSIS — M79.10 MYALGIA, UNSPECIFIED SITE: ICD-10-CM

## 2022-08-02 ENCOUNTER — NON-APPOINTMENT (OUTPATIENT)
Age: 72
End: 2022-08-02

## 2022-09-21 LAB
HBA1C MFR BLD HPLC: 7.2
LDLC SERPL DIRECT ASSAY-MCNC: 56

## 2022-09-22 ENCOUNTER — RESULT CHARGE (OUTPATIENT)
Age: 72
End: 2022-09-22

## 2022-09-22 ENCOUNTER — APPOINTMENT (OUTPATIENT)
Dept: ENDOCRINOLOGY | Facility: CLINIC | Age: 72
End: 2022-09-22

## 2022-09-22 VITALS
HEART RATE: 69 BPM | HEIGHT: 68 IN | WEIGHT: 251 LBS | OXYGEN SATURATION: 96 % | SYSTOLIC BLOOD PRESSURE: 136 MMHG | DIASTOLIC BLOOD PRESSURE: 68 MMHG | BODY MASS INDEX: 38.04 KG/M2

## 2022-09-22 DIAGNOSIS — Z86.39 PERSONAL HISTORY OF OTHER ENDOCRINE, NUTRITIONAL AND METABOLIC DISEASE: ICD-10-CM

## 2022-09-22 LAB — GLUCOSE BLDC GLUCOMTR-MCNC: 146

## 2022-09-22 PROCEDURE — 82962 GLUCOSE BLOOD TEST: CPT

## 2022-09-22 PROCEDURE — 99214 OFFICE O/P EST MOD 30 MIN: CPT | Mod: 25

## 2022-09-22 RX ORDER — DUPILUMAB 300 MG/2ML
300 INJECTION, SOLUTION SUBCUTANEOUS
Qty: 4 | Refills: 0 | Status: DISCONTINUED | COMMUNITY
Start: 2022-07-05 | End: 2022-09-22

## 2022-09-22 RX ORDER — METHOCARBAMOL 500 MG/1
500 TABLET, FILM COATED ORAL
Qty: 60 | Refills: 2 | Status: DISCONTINUED | COMMUNITY
Start: 2022-07-27 | End: 2022-09-22

## 2022-09-22 NOTE — DATA REVIEWED
[FreeTextEntry1] : Labs 9.22.18\par Cr 0.91\par LDl chol 73\par HDl 52\par A1c 6.2%\par \par Labs 1/5/18\par Cr 0.85, GFR 67\par LDl chol 76, Tg 84\par A1c 7.3\par urine microalb/Cr 0\par \par Labs 4.6.19\par Gluc 106, A1c 7.1\par Cr 0.81, GFR 71\par LDL 66, HDL 55, Tg 68\par TSh 3.75\par \par Labs 7/6/19\par LDL 66, Tg 76, HDL 55\par A1c 7.1\par \par labs 10/5/19\par Gluc 162, A1c 7.2\par Cr 0.91, GFR 61\par LDL 71, HDL 50, Tg 96\par \par Labs 1/4/20\par Gluc 139, A1c 7.5\par Cr 0.84, GFR 67\par LDL 68, Tg 95, HDL 55\par \par Labs 8/8/20 A1c 7.2\par \par Labs 11/14/20\par Gluc 151, A1c 7%\par Cr 0.94, GFR 59\par LDL 62, HDL 55, Tg 78\par B12 532\par vit D 25\par urine microalb/Cr neg\par \par Labs 2/20/21\par Gluc 103, A1c 6.8\par Cr 0.99, GFR 56\par HDL 53, Tg 61, 59\par TSH 3.00\par B12 571\par vit D 23\par neg urine alb/cr\par \par Labs 6/5/21\par Gluc 80, A1c 6.7\par Tg 84, HDL 49, LDL 64\par AST 34, ALT 47\par TSH 2.89\par vit D 36\par urine alb/Cr neg\par \par "Labs 9/11/21 reviewed\par A1c 7.1 and LDL chol 71 - good, cont current meds\par AST 34, ALT 37 - mildly elevated, f/u PCP"\par \par labs 12/1/21\par A1c 7.2\par LDL 71\par AST 35, ALT 39\par \par Labs 3/19/22\par Gluc 160, A1c 7.5\par Cr 0.99, GFR 55\par AST 36, ALT 43\par Tg 75, HDL 54, LDL 67\par CBC wnl\par urine alb/Cr neg\par \par Labs 6/18/22\par Gluc 163, A1c 7.2\par Cr 0.98, GFR 56\par AST 33 ALT 41\par Tg 89, HDL 52, LDL 56\par \par Labs 9/16/22\par Gluc 133, A1c 7%\par Cr 0.87, GFR 64\par AST 35, ALT 54\par Tg 86, LDLc 61, HDLc 55

## 2022-09-22 NOTE — ASSESSMENT
[FreeTextEntry1] : 1. T2DM w retinopathy- good control, A1c 7%, mild fasting hyperglycemia on lof\par - cont Glimepiride BID, Kombiglyze BID\par - increase Lantus to 40 units\par - cont Humalog 10 w meals + scale\par - intolerant to SGKT2 inhibitor, does not want GLP1 agonist after discussion of risks/benefits\par - cont to test 4x daily and call with highs/lows\par - yearly eye exam with ophthalmology\par -  B12 level okay on MFN, monitor yearly\par - repeat A1c 3 months\par \par 2. HTN -  cont ACEi\par \par 3. obesity\par - discussed weight loss w lifestyle changes, diet and exercise\par - she does not want GLP1 agonist at this time\par - thinking about bariatric surgery\par \par 4. elevated LFT ?fatty liver- counseled pt on weight loss, GI eval advised\par \par \par

## 2022-09-22 NOTE — HISTORY OF PRESENT ILLNESS
[FreeTextEntry1] : Interval Hx-  no issues today, side effects from Dupixent from dermatology\par \par T2DM dx in 2017 with A1c 10.7% - she had been on Kombiglyze and MFN for several years prior to 2017\par does not want Riana sensor, worsening due to diet. Tried jardiance sampled but did not tolerate it - vaginal rash, excessive urination, bladder cramps\par \par Current DM meds: \par Glimepiride 2 mg 1 tab BID\par Kombiglyze 2.5/1000 mg BID\par Lantus 38 units nightly\par Humalog 10 w meals\par Humalog scale 151-200 2 units, 201-300 4 units, 301+ 6\par \par SMBG: testing 4x daily, logs scanned in. fasting hyperglycemia -150's, daytime 's\par \par Complications:\par 2022 eye exam - (+) stable DR by history\par 2022 neg urine microalb/Cr\par denies dysesthesias\par \par \par

## 2022-09-22 NOTE — PHYSICAL EXAM
[Alert] : alert [Obese] : obese [No LAD] : no lymphadenopathy [No Thyroid Nodules] : no palpable thyroid nodules [Clear to Auscultation] : lungs were clear to auscultation bilaterally [Normal S1, S2] : normal S1 and S2 [Normal Rate] : heart rate was normal [No Edema] : no peripheral edema [Not Tender] : non-tender [Soft] : abdomen soft [Oriented x3] : oriented to person, place, and time [Normal Affect] : the affect was normal [Normal Insight/Judgement] : insight and judgment were intact [Normal Mood] : the mood was normal [Foot Ulcers] : no foot ulcers [2+] : 2+ in the dorsalis pedis [Vibration Dec.] : normal vibratory sensation at the level of the toes [Diminished Throughout Both Feet] : normal tactile sensation with monofilament testing throughout both feet [de-identified] : (+) vericose veins [de-identified] : Feet - bilateral bunion

## 2022-09-22 NOTE — REVIEW OF SYSTEMS
[Recent Weight Gain (___ Lbs)] : recent weight gain: [unfilled] lbs [Blurred Vision] : no blurred vision [Chest Pain] : no chest pain [Shortness Of Breath] : no shortness of breath [Nausea] : no nausea [Abdominal Pain] : no abdominal pain [Polyuria] : no polyuria [Nocturia] : no nocturia [Pain/Numbness of Digits] : no pain/numbness of digits [Polydipsia] : no polydipsia [FreeTextEntry8] : overactive bladder

## 2022-09-29 ENCOUNTER — TRANSCRIPTION ENCOUNTER (OUTPATIENT)
Age: 72
End: 2022-09-29

## 2022-10-13 ENCOUNTER — TRANSCRIPTION ENCOUNTER (OUTPATIENT)
Age: 72
End: 2022-10-13

## 2022-10-24 ENCOUNTER — APPOINTMENT (OUTPATIENT)
Dept: FAMILY MEDICINE | Facility: CLINIC | Age: 72
End: 2022-10-24

## 2022-10-24 VITALS — DIASTOLIC BLOOD PRESSURE: 80 MMHG | SYSTOLIC BLOOD PRESSURE: 130 MMHG

## 2022-10-24 VITALS
RESPIRATION RATE: 12 BRPM | HEART RATE: 74 BPM | DIASTOLIC BLOOD PRESSURE: 80 MMHG | SYSTOLIC BLOOD PRESSURE: 136 MMHG | BODY MASS INDEX: 38.04 KG/M2 | HEIGHT: 68 IN | OXYGEN SATURATION: 97 % | WEIGHT: 251 LBS

## 2022-10-24 VITALS — TEMPERATURE: 97 F

## 2022-10-24 DIAGNOSIS — T50.905A ADVERSE EFFECT OF UNSPECIFIED DRUGS, MEDICAMENTS AND BIOLOGICAL SUBSTANCES, INITIAL ENCOUNTER: ICD-10-CM

## 2022-10-24 DIAGNOSIS — Z23 ENCOUNTER FOR IMMUNIZATION: ICD-10-CM

## 2022-10-24 PROCEDURE — 99214 OFFICE O/P EST MOD 30 MIN: CPT | Mod: 25

## 2022-10-24 PROCEDURE — 90662 IIV NO PRSV INCREASED AG IM: CPT

## 2022-10-24 PROCEDURE — G0008: CPT

## 2022-10-24 RX ORDER — CLINDAMYCIN AND BENZOYL PEROXIDE 50; 10 MG/G; MG/G
1-5 GEL TOPICAL
Qty: 25 | Refills: 0 | Status: DISCONTINUED | COMMUNITY
Start: 2022-10-12

## 2022-10-24 NOTE — REVIEW OF SYSTEMS
[Joint Pain] : joint pain [Joint Stiffness] : joint stiffness [Itching] : Itching [Skin Rash] : skin rash [Negative] : Heme/Lymph

## 2022-10-24 NOTE — PLAN
[FreeTextEntry1] : the endocrinology note and lab report including CMP, lipids and A1C was reviewed, her conditioins are stable and no medication changes were made, she will consider going for a consultation with a bariatric surgeon and will follow up in 3 months or sooner prn, high dose flu vaccine given

## 2022-10-24 NOTE — HISTORY OF PRESENT ILLNESS
[FreeTextEntry1] : pt presents for med follow up and flu shot  [de-identified] : Her blood sugars is in the low 100 range.  She is off of Dupixent.  She is considering bariatric surgery

## 2022-11-04 ENCOUNTER — TRANSCRIPTION ENCOUNTER (OUTPATIENT)
Age: 72
End: 2022-11-04

## 2023-01-04 LAB
HBA1C MFR BLD HPLC: 7
LDLC SERPL DIRECT ASSAY-MCNC: 66

## 2023-01-05 ENCOUNTER — APPOINTMENT (OUTPATIENT)
Dept: ENDOCRINOLOGY | Facility: CLINIC | Age: 73
End: 2023-01-05
Payer: COMMERCIAL

## 2023-01-05 ENCOUNTER — RESULT CHARGE (OUTPATIENT)
Age: 73
End: 2023-01-05

## 2023-01-05 VITALS
WEIGHT: 250 LBS | HEIGHT: 68 IN | DIASTOLIC BLOOD PRESSURE: 84 MMHG | SYSTOLIC BLOOD PRESSURE: 132 MMHG | HEART RATE: 84 BPM | BODY MASS INDEX: 37.89 KG/M2

## 2023-01-05 LAB — GLUCOSE BLDC GLUCOMTR-MCNC: 135

## 2023-01-05 PROCEDURE — 82962 GLUCOSE BLOOD TEST: CPT

## 2023-01-05 PROCEDURE — 99214 OFFICE O/P EST MOD 30 MIN: CPT | Mod: 25

## 2023-01-05 NOTE — REVIEW OF SYSTEMS
[Recent Weight Gain (___ Lbs)] : recent weight gain: [unfilled] lbs [SOB on Exertion] : shortness of breath on exertion [Blurred Vision] : no blurred vision [Chest Pain] : no chest pain [Shortness Of Breath] : no shortness of breath [Nausea] : no nausea [Abdominal Pain] : no abdominal pain [Polyuria] : no polyuria [Nocturia] : no nocturia [Pain/Numbness of Digits] : no pain/numbness of digits [Polydipsia] : no polydipsia [FreeTextEntry8] : overactive bladder

## 2023-01-05 NOTE — PHYSICAL EXAM
[Alert] : alert [Obese] : obese [No LAD] : no lymphadenopathy [No Thyroid Nodules] : no palpable thyroid nodules [Clear to Auscultation] : lungs were clear to auscultation bilaterally [Normal S1, S2] : normal S1 and S2 [Normal Rate] : heart rate was normal [No Edema] : no peripheral edema [Not Tender] : non-tender [Soft] : abdomen soft [Oriented x3] : oriented to person, place, and time [Normal Affect] : the affect was normal [Normal Insight/Judgement] : insight and judgment were intact [Normal Mood] : the mood was normal

## 2023-01-05 NOTE — DATA REVIEWED
[FreeTextEntry1] : Labs 9.22.18\par Cr 0.91\par LDl chol 73\par HDl 52\par A1c 6.2%\par \par Labs 1/5/18\par Cr 0.85, GFR 67\par LDl chol 76, Tg 84\par A1c 7.3\par urine microalb/Cr 0\par \par Labs 4.6.19\par Gluc 106, A1c 7.1\par Cr 0.81, GFR 71\par LDL 66, HDL 55, Tg 68\par TSh 3.75\par \par Labs 7/6/19\par LDL 66, Tg 76, HDL 55\par A1c 7.1\par \par labs 10/5/19\par Gluc 162, A1c 7.2\par Cr 0.91, GFR 61\par LDL 71, HDL 50, Tg 96\par \par Labs 1/4/20\par Gluc 139, A1c 7.5\par Cr 0.84, GFR 67\par LDL 68, Tg 95, HDL 55\par \par Labs 8/8/20 A1c 7.2\par \par Labs 11/14/20\par Gluc 151, A1c 7%\par Cr 0.94, GFR 59\par LDL 62, HDL 55, Tg 78\par B12 532\par vit D 25\par urine microalb/Cr neg\par \par Labs 2/20/21\par Gluc 103, A1c 6.8\par Cr 0.99, GFR 56\par HDL 53, Tg 61, 59\par TSH 3.00\par B12 571\par vit D 23\par neg urine alb/cr\par \par Labs 6/5/21\par Gluc 80, A1c 6.7\par Tg 84, HDL 49, LDL 64\par AST 34, ALT 47\par TSH 2.89\par vit D 36\par urine alb/Cr neg\par \par "Labs 9/11/21 reviewed\par A1c 7.1 and LDL chol 71 - good, cont current meds\par AST 34, ALT 37 - mildly elevated, f/u PCP"\par \par labs 12/1/21\par A1c 7.2\par LDL 71\par AST 35, ALT 39\par \par Labs 3/19/22\par Gluc 160, A1c 7.5\par Cr 0.99, GFR 55\par AST 36, ALT 43\par Tg 75, HDL 54, LDL 67\par CBC wnl\par urine alb/Cr neg\par \par Labs 6/18/22\par Gluc 163, A1c 7.2\par Cr 0.98, GFR 56\par AST 33 ALT 41\par Tg 89, HDL 52, LDL 56\par \par Labs 9/16/22\par Gluc 133, A1c 7%\par Cr 0.87, GFR 64\par AST 35, ALT 54\par Tg 86, LDLc 61, HDLc 55\par \par Labs 12/27/22\par Gluc 131, A1c 7%\par Cr 0.85 GFR 66\par Tg 88, HDL 55, LDL 66\par B12 507\par AST 38 ALT 50

## 2023-01-05 NOTE — ASSESSMENT
[FreeTextEntry1] : 1. T2DM w retinopathy- good control, A1c 7%, good control on logs\par - cont Glimepiride BID, Kombiglyze BID\par - cont Lantus 40 units\par - cont Humalog 10 w meals + scale\par - intolerant to SGKT2 inhibitor, does not want GLP1 agonist after discussion of risks/benefits\par - cont to test 4x daily and call with highs/lows\par - yearly eye exam with ophthalmology\par -  B12 level okay on MFN, monitor yearly\par - repeat A1c 3 months\par \par 2. HTN -  cont ACEi\par \par 3. obesity\par - discussed weight loss w lifestyle changes, diet and exercise\par - she does not want GLP1 agonist at this time\par - thinking about bariatric surgery\par \par 4. elevated LFT ?fatty liver- counseled pt on weight loss, GI eval advised\par \par 5. PÉREZ - cardio eval advised\par \par \par

## 2023-01-05 NOTE — HISTORY OF PRESENT ILLNESS
[FreeTextEntry1] : Interval Hx-  no issues today, no changes\par \par T2DM dx in 2017 with A1c 10.7% - she had been on Kombiglyze and MFN for several years prior to 2017\par does not want Riana sensor, worsening due to diet. Tried jardiance sampled but did not tolerate it - vaginal rash, excessive urination, bladder cramps\par \par Current DM meds: \par Glimepiride 2 mg 1 tab BID\par Kombiglyze 2.5/1000 mg BID\par Lantus 40 units nightly\par Humalog 10 w meals\par Humalog scale 151-200 2 units, 201-300 4 units, 301+ 6\par \par SMBG: testing 4x daily, logs scanned in. fasting hyperglycemia -140's, daytime 100-160's\par \par Complications:\par 2022 eye exam - (+) stable DR by history\par 2022 neg urine microalb/Cr\par denies dysesthesias\par \par \par

## 2023-01-20 ENCOUNTER — TRANSCRIPTION ENCOUNTER (OUTPATIENT)
Age: 73
End: 2023-01-20

## 2023-01-30 ENCOUNTER — APPOINTMENT (OUTPATIENT)
Dept: FAMILY MEDICINE | Facility: CLINIC | Age: 73
End: 2023-01-30
Payer: COMMERCIAL

## 2023-01-30 VITALS — TEMPERATURE: 97.4 F

## 2023-01-30 VITALS
RESPIRATION RATE: 12 BRPM | DIASTOLIC BLOOD PRESSURE: 80 MMHG | HEIGHT: 68 IN | OXYGEN SATURATION: 97 % | HEART RATE: 72 BPM | SYSTOLIC BLOOD PRESSURE: 130 MMHG | BODY MASS INDEX: 37.89 KG/M2 | WEIGHT: 250 LBS

## 2023-01-30 PROCEDURE — 99214 OFFICE O/P EST MOD 30 MIN: CPT

## 2023-01-30 NOTE — HISTORY OF PRESENT ILLNESS
[FreeTextEntry1] : pt presents for med follow up  [de-identified] : Unfortunately her cousin just lost his wife and she has been trying to keep him busy, her other cousin fell and broke her hip and she is caring for her dog so it has been stressful.  She denies any health changes for herself.

## 2023-01-30 NOTE — PLAN
[FreeTextEntry1] : the endocrinology note and lab report including CMP, lipids, A1c and B12 was reviewed, her conditions are stable and no medication changes were made, she will continue healthy lifestyle and will follow up in 3 months or sooner prn

## 2023-03-10 ENCOUNTER — TRANSCRIPTION ENCOUNTER (OUTPATIENT)
Age: 73
End: 2023-03-10

## 2023-03-24 ENCOUNTER — TRANSCRIPTION ENCOUNTER (OUTPATIENT)
Age: 73
End: 2023-03-24

## 2023-04-04 LAB
HBA1C MFR BLD HPLC: 7.2
LDLC SERPL DIRECT ASSAY-MCNC: 65

## 2023-04-05 ENCOUNTER — RESULT CHARGE (OUTPATIENT)
Age: 73
End: 2023-04-05

## 2023-04-05 ENCOUNTER — APPOINTMENT (OUTPATIENT)
Dept: ENDOCRINOLOGY | Facility: CLINIC | Age: 73
End: 2023-04-05
Payer: COMMERCIAL

## 2023-04-05 VITALS
SYSTOLIC BLOOD PRESSURE: 136 MMHG | DIASTOLIC BLOOD PRESSURE: 64 MMHG | BODY MASS INDEX: 37.59 KG/M2 | WEIGHT: 248 LBS | HEART RATE: 67 BPM | OXYGEN SATURATION: 98 % | HEIGHT: 68 IN

## 2023-04-05 LAB — GLUCOSE BLDC GLUCOMTR-MCNC: 114

## 2023-04-05 PROCEDURE — 82962 GLUCOSE BLOOD TEST: CPT

## 2023-04-05 PROCEDURE — 99214 OFFICE O/P EST MOD 30 MIN: CPT | Mod: 25

## 2023-04-05 RX ORDER — BLOOD-GLUCOSE CONTROL, NORMAL
EACH MISCELLANEOUS
Qty: 1 | Refills: 0 | Status: DISCONTINUED | COMMUNITY
Start: 2021-12-09 | End: 2023-04-05

## 2023-04-05 RX ORDER — LANCETS 33 GAUGE
EACH MISCELLANEOUS
Qty: 4 | Refills: 3 | Status: ACTIVE | COMMUNITY
Start: 2018-01-23 | End: 1900-01-01

## 2023-04-05 NOTE — DATA REVIEWED
[FreeTextEntry1] : Labs 9.22.18\par Cr 0.91\par LDl chol 73\par HDl 52\par A1c 6.2%\par \par Labs 1/5/18\par Cr 0.85, GFR 67\par LDl chol 76, Tg 84\par A1c 7.3\par urine microalb/Cr 0\par \par Labs 4.6.19\par Gluc 106, A1c 7.1\par Cr 0.81, GFR 71\par LDL 66, HDL 55, Tg 68\par TSh 3.75\par \par Labs 7/6/19\par LDL 66, Tg 76, HDL 55\par A1c 7.1\par \par labs 10/5/19\par Gluc 162, A1c 7.2\par Cr 0.91, GFR 61\par LDL 71, HDL 50, Tg 96\par \par Labs 1/4/20\par Gluc 139, A1c 7.5\par Cr 0.84, GFR 67\par LDL 68, Tg 95, HDL 55\par \par Labs 8/8/20 A1c 7.2\par \par Labs 11/14/20\par Gluc 151, A1c 7%\par Cr 0.94, GFR 59\par LDL 62, HDL 55, Tg 78\par B12 532\par vit D 25\par urine microalb/Cr neg\par \par Labs 2/20/21\par Gluc 103, A1c 6.8\par Cr 0.99, GFR 56\par HDL 53, Tg 61, 59\par TSH 3.00\par B12 571\par vit D 23\par neg urine alb/cr\par \par Labs 6/5/21\par Gluc 80, A1c 6.7\par Tg 84, HDL 49, LDL 64\par AST 34, ALT 47\par TSH 2.89\par vit D 36\par urine alb/Cr neg\par \par "Labs 9/11/21 reviewed\par A1c 7.1 and LDL chol 71 - good, cont current meds\par AST 34, ALT 37 - mildly elevated, f/u PCP"\par \par labs 12/1/21\par A1c 7.2\par LDL 71\par AST 35, ALT 39\par \par Labs 3/19/22\par Gluc 160, A1c 7.5\par Cr 0.99, GFR 55\par AST 36, ALT 43\par Tg 75, HDL 54, LDL 67\par CBC wnl\par urine alb/Cr neg\par \par Labs 6/18/22\par Gluc 163, A1c 7.2\par Cr 0.98, GFR 56\par AST 33 ALT 41\par Tg 89, HDL 52, LDL 56\par \par Labs 9/16/22\par Gluc 133, A1c 7%\par Cr 0.87, GFR 64\par AST 35, ALT 54\par Tg 86, LDLc 61, HDLc 55\par \par Labs 12/27/22\par Gluc 131, A1c 7%\par Cr 0.85 GFR 66\par Tg 88, HDL 55, LDL 66\par B12 507\par AST 38 ALT 50\par \par Labs 3/28/23\par Gluc 127\par Cr 0.87, GFR 64\par Tg 99, HDL 51,LDL 65\par A1c 7.2

## 2023-04-05 NOTE — HISTORY OF PRESENT ILLNESS
[FreeTextEntry1] : Interval Hx-  undergoing work up for bariatric surgery w Dr Banks\par \par T2DM dx in 2017 with A1c 10.7% - she had been on Kombiglyze and MFN for several years prior to 2017\par does not want Riana sensor, worsening due to diet. Tried jardiance sampled but did not tolerate it - vaginal rash, excessive urination, bladder cramps\par \par Current DM meds: \par Glimepiride 2 mg 1 tab BID\par Kombiglyze 2.5/1000 mg BID\par Lantus 40 units nightly\par Humalog 10 w meals\par Humalog scale 151-200 2 units, 201-300 4 units, 301+ 6\par \par SMBG: testing 4x daily, per pt FS running high in morning - -190's, denies lows\par \par Complications:\par 2022 eye exam - (+) stable DR by history\par 2022 neg urine microalb/Cr\par denies dysesthesias\par \par \par

## 2023-04-05 NOTE — ASSESSMENT
[FreeTextEntry1] : 1. T2DM w retinopathy- good control, A1c 7.2%, fasting hyperglycemia by history\par - check 2-3 am FS to exclude hypoglycemia causing am hyperglycemia\par - cont Glimepiride BID, Kombiglyze BID\par - cont Lantus 40 units\par - cont Humalog 10 w meals + scale\par - intolerant to SGKT2 inhibitor, does not want GLP1 agonist after discussion of risks/benefits\par - cont to test 4x daily and call with highs/lows\par - yearly eye exam with ophthalmology\par -  B12 level okay on MFN, monitor yearly\par - repeat A1c 3 months\par \par 2. HTN -  cont ACEi\par \par 3. obesity\par - discussed weight loss w lifestyle changes, diet and exercise\par - she does not want GLP1 agonist at this time\par - f/u bariatric surgery\par \par 4. elevated LFT ?fatty liver- counseled pt on weight loss, GI eval soon\par \par 5. PÉREZ - cardio eval soon\par \par \par

## 2023-04-05 NOTE — REVIEW OF SYSTEMS
[SOB on Exertion] : shortness of breath on exertion [Recent Weight Gain (___ Lbs)] : no recent weight gain [Recent Weight Loss (___ Lbs)] : no recent weight loss [Blurred Vision] : no blurred vision [Chest Pain] : no chest pain [Shortness Of Breath] : no shortness of breath [Nausea] : no nausea [Abdominal Pain] : no abdominal pain [Polyuria] : no polyuria [Nocturia] : no nocturia [Pain/Numbness of Digits] : no pain/numbness of digits [Polydipsia] : no polydipsia [FreeTextEntry8] : overactive bladder

## 2023-04-24 ENCOUNTER — APPOINTMENT (OUTPATIENT)
Dept: FAMILY MEDICINE | Facility: CLINIC | Age: 73
End: 2023-04-24
Payer: COMMERCIAL

## 2023-04-24 VITALS — DIASTOLIC BLOOD PRESSURE: 80 MMHG | SYSTOLIC BLOOD PRESSURE: 136 MMHG

## 2023-04-24 VITALS
RESPIRATION RATE: 14 BRPM | BODY MASS INDEX: 37.89 KG/M2 | OXYGEN SATURATION: 98 % | HEART RATE: 82 BPM | WEIGHT: 250 LBS | DIASTOLIC BLOOD PRESSURE: 80 MMHG | SYSTOLIC BLOOD PRESSURE: 140 MMHG | HEIGHT: 68 IN

## 2023-04-24 PROCEDURE — 99213 OFFICE O/P EST LOW 20 MIN: CPT

## 2023-04-24 NOTE — PHYSICAL EXAM
[No Acute Distress] : no acute distress [Well Nourished] : well nourished [Well Developed] : well developed [Well-Appearing] : well-appearing [Normal Voice/Communication] : normal voice/communication [No Respiratory Distress] : no respiratory distress  [No Accessory Muscle Use] : no accessory muscle use [Clear to Auscultation] : lungs were clear to auscultation bilaterally [Normal Rate] : normal rate  [Regular Rhythm] : with a regular rhythm [Normal S1, S2] : normal S1 and S2 [No Edema] : there was no peripheral edema [Coordination Grossly Intact] : coordination grossly intact [Normal Mood] : the mood was normal

## 2023-04-24 NOTE — HISTORY OF PRESENT ILLNESS
[FreeTextEntry1] : pt presents for med follow up  [de-identified] : She is seeing Dr Banks and is having presurgical testing and plans to have a gastric bypass.  She had an eye exam May 2022.  She denies any other health changes.

## 2023-04-24 NOTE — PLAN
[FreeTextEntry1] : the hypertension is under acceptable control and she will continue taking lisinopril 40 mg daily.  The endocrinology note and labs were reviewed, she will follow up for presurgical clearance

## 2023-05-18 ENCOUNTER — TRANSCRIPTION ENCOUNTER (OUTPATIENT)
Age: 73
End: 2023-05-18

## 2023-06-08 ENCOUNTER — APPOINTMENT (OUTPATIENT)
Dept: FAMILY MEDICINE | Facility: CLINIC | Age: 73
End: 2023-06-08
Payer: COMMERCIAL

## 2023-06-08 VITALS
BODY MASS INDEX: 37.74 KG/M2 | DIASTOLIC BLOOD PRESSURE: 80 MMHG | RESPIRATION RATE: 14 BRPM | HEIGHT: 68 IN | OXYGEN SATURATION: 97 % | TEMPERATURE: 98.3 F | WEIGHT: 249 LBS | SYSTOLIC BLOOD PRESSURE: 130 MMHG | HEART RATE: 103 BPM

## 2023-06-08 PROCEDURE — 99213 OFFICE O/P EST LOW 20 MIN: CPT

## 2023-06-08 NOTE — ASSESSMENT
[Patient Optimized for Surgery] : Patient optimized for surgery [No Further Testing Recommended] : no further testing recommended [As per surgery] : as per surgery [FreeTextEntry4] : she is medically cleared for surgery [FreeTextEntry7] : she will hold the diabetes medications the day of surgery

## 2023-06-08 NOTE — PHYSICAL EXAM
[No Acute Distress] : no acute distress [Well Nourished] : well nourished [Well Developed] : well developed [Well-Appearing] : well-appearing [Normal Voice/Communication] : normal voice/communication [Normal Sclera/Conjunctiva] : normal sclera/conjunctiva [Normal Outer Ear/Nose] : the outer ears and nose were normal in appearance [Normal TMs] : both tympanic membranes were normal [No Lymphadenopathy] : no lymphadenopathy [Supple] : supple [No Respiratory Distress] : no respiratory distress  [No Accessory Muscle Use] : no accessory muscle use [Clear to Auscultation] : lungs were clear to auscultation bilaterally [Normal Rate] : normal rate  [Regular Rhythm] : with a regular rhythm [Normal S1, S2] : normal S1 and S2 [No Edema] : there was no peripheral edema [Soft] : abdomen soft [Non Tender] : non-tender [Non-distended] : non-distended [Coordination Grossly Intact] : coordination grossly intact [Normal Mood] : the mood was normal [de-identified] : has varicosities and spider veins

## 2023-06-08 NOTE — HISTORY OF PRESENT ILLNESS
[Sleep Apnea] : sleep apnea [No Adverse Anesthesia Reaction] : no adverse anesthesia reaction in self or family member [Diabetes] : diabetes [(Patient denies any chest pain, claudication, dyspnea on exertion, orthopnea, palpitations or syncope)] : Patient denies any chest pain, claudication, dyspnea on exertion, orthopnea, palpitations or syncope [Aortic Stenosis] : no aortic stenosis [Atrial Fibrillation] : no atrial fibrillation [Coronary Artery Disease] : no coronary artery disease [Recent Myocardial Infarction] : no recent myocardial infarction [Implantable Device/Pacemaker] : no implantable device/pacemaker [Asthma] : no asthma [COPD] : no COPD [Smoker] : not a smoker [Family Member] : no family member with adverse anesthesia reaction/sudden death [Self] : no previous adverse anesthesia reaction [Chronic Anticoagulation] : no chronic anticoagulation [Chronic Kidney Disease] : no chronic kidney disease [FreeTextEntry1] : gastric bypass [FreeTextEntry2] : 06/19/2023 [FreeTextEntry3] : Dr. Garry Bledsoe  [FreeTextEntry4] : patient presents for medical clearance  [FreeTextEntry5] : has hypertension [FreeTextEntry7] : she had cardiac testing and received clearance

## 2023-06-22 ENCOUNTER — NON-APPOINTMENT (OUTPATIENT)
Age: 73
End: 2023-06-22

## 2023-06-26 ENCOUNTER — TRANSCRIPTION ENCOUNTER (OUTPATIENT)
Age: 73
End: 2023-06-26

## 2023-06-26 RX ORDER — BLOOD SUGAR DIAGNOSTIC
STRIP MISCELLANEOUS
Qty: 4 | Refills: 3 | Status: ACTIVE | COMMUNITY
Start: 2018-01-12 | End: 1900-01-01

## 2023-06-28 ENCOUNTER — TRANSCRIPTION ENCOUNTER (OUTPATIENT)
Age: 73
End: 2023-06-28

## 2023-07-05 ENCOUNTER — TRANSCRIPTION ENCOUNTER (OUTPATIENT)
Age: 73
End: 2023-07-05

## 2023-07-11 ENCOUNTER — APPOINTMENT (OUTPATIENT)
Dept: FAMILY MEDICINE | Facility: CLINIC | Age: 73
End: 2023-07-11
Payer: COMMERCIAL

## 2023-07-11 VITALS
WEIGHT: 227 LBS | HEART RATE: 82 BPM | RESPIRATION RATE: 14 BRPM | SYSTOLIC BLOOD PRESSURE: 124 MMHG | DIASTOLIC BLOOD PRESSURE: 80 MMHG | OXYGEN SATURATION: 98 % | BODY MASS INDEX: 38.76 KG/M2 | HEIGHT: 64 IN | TEMPERATURE: 98.6 F

## 2023-07-11 VITALS — DIASTOLIC BLOOD PRESSURE: 70 MMHG | SYSTOLIC BLOOD PRESSURE: 130 MMHG

## 2023-07-11 DIAGNOSIS — Z98.84 BARIATRIC SURGERY STATUS: ICD-10-CM

## 2023-07-11 DIAGNOSIS — Z01.818 ENCOUNTER FOR OTHER PREPROCEDURAL EXAMINATION: ICD-10-CM

## 2023-07-11 PROCEDURE — 99214 OFFICE O/P EST MOD 30 MIN: CPT

## 2023-07-11 RX ORDER — LISINOPRIL 40 MG/1
40 TABLET ORAL DAILY
Qty: 30 | Refills: 11 | Status: DISCONTINUED | COMMUNITY
End: 2023-07-11

## 2023-07-11 RX ORDER — INSULIN LISPRO 100 [IU]/ML
100 INJECTION, SOLUTION INTRAVENOUS; SUBCUTANEOUS
Qty: 30 | Refills: 1 | Status: DISCONTINUED | COMMUNITY
Start: 2020-07-07 | End: 2023-07-11

## 2023-07-11 RX ORDER — INSULIN GLARGINE 100 [IU]/ML
100 INJECTION, SOLUTION SUBCUTANEOUS DAILY
Qty: 4 | Refills: 1 | Status: DISCONTINUED | COMMUNITY
End: 2023-07-11

## 2023-07-11 RX ORDER — GLIMEPIRIDE 2 MG/1
2 TABLET ORAL
Qty: 180 | Refills: 1 | Status: DISCONTINUED | COMMUNITY
Start: 2018-11-13 | End: 2023-07-11

## 2023-07-11 RX ORDER — SAXAGLIPTIN AND METFORMIN HYDROCHLORIDE 2.5; 1 MG/1; MG/1
2.5-1 TABLET, FILM COATED, EXTENDED RELEASE ORAL
Qty: 180 | Refills: 2 | Status: DISCONTINUED | COMMUNITY
Start: 2018-01-24 | End: 2023-07-11

## 2023-07-11 NOTE — HISTORY OF PRESENT ILLNESS
[FreeTextEntry1] : Patient presents for a f/u from her surgery. Patient states she's feeling well. [de-identified] : She had the gastric bypass on 6/19.  She has lost over 20 pounds.  She is eating soft foods.  She hasn't had any vomiting.  She has one incision that is a little red and itchy.  She caught a "chest cold" and has a slight lingering cough.  She has been off of the lisinopril since the surgery.  Her blood sugar has been good for the most part, it sometimes spikes after drinking the protein shakes.  She doesn't have the urinate often, she is taking the oxybutynin bid and is asking if she can wean off.  The leg swelling has improved

## 2023-07-11 NOTE — PLAN
[FreeTextEntry1] : she was advised to remain off of the lisinopril as the hypertension is stable off of medications, she may reduce the oxybutynin to 5 mg daily for 1 week then 5 mg every other day for 1 week then stop but resume if the urinary frequency worsens, she will follow up with the bariatric surgeon as scheduled and may apply cetaphil to the incisions and will follow up with me in 4-6 weeks or sooner prn

## 2023-07-11 NOTE — PHYSICAL EXAM
[No Acute Distress] : no acute distress [Well Nourished] : well nourished [Well Developed] : well developed [Well-Appearing] : well-appearing [Normal Voice/Communication] : normal voice/communication [No Respiratory Distress] : no respiratory distress  [No Accessory Muscle Use] : no accessory muscle use [Clear to Auscultation] : lungs were clear to auscultation bilaterally [Regular Rhythm] : with a regular rhythm [Normal Rate] : normal rate  [Normal S1, S2] : normal S1 and S2 [No Edema] : there was no peripheral edema [Coordination Grossly Intact] : coordination grossly intact [Normal Mood] : the mood was normal [de-identified] : incisions healed, no discharge

## 2023-07-11 NOTE — REVIEW OF SYSTEMS
[Recent Change In Weight] : ~T recent weight change [Negative] : Heme/Lymph [Lower Ext Edema] : no lower extremity edema [Frequency] : no frequency [de-identified] : red and itchy incision

## 2023-08-30 LAB
HBA1C MFR BLD HPLC: 5.7
LDLC SERPL DIRECT ASSAY-MCNC: 54
MICROALBUMIN/CREAT 24H UR-RTO: 14

## 2023-08-31 ENCOUNTER — APPOINTMENT (OUTPATIENT)
Dept: ENDOCRINOLOGY | Facility: CLINIC | Age: 73
End: 2023-08-31
Payer: COMMERCIAL

## 2023-08-31 VITALS
DIASTOLIC BLOOD PRESSURE: 88 MMHG | WEIGHT: 211 LBS | SYSTOLIC BLOOD PRESSURE: 120 MMHG | BODY MASS INDEX: 36.02 KG/M2 | HEIGHT: 64 IN | HEART RATE: 67 BPM

## 2023-08-31 LAB — GLUCOSE BLDC GLUCOMTR-MCNC: 171

## 2023-08-31 PROCEDURE — 82962 GLUCOSE BLOOD TEST: CPT

## 2023-08-31 PROCEDURE — 99214 OFFICE O/P EST MOD 30 MIN: CPT | Mod: 25

## 2023-08-31 RX ORDER — INSULIN GLARGINE 100 [IU]/ML
100 INJECTION, SOLUTION SUBCUTANEOUS DAILY
Refills: 0 | Status: DISCONTINUED | COMMUNITY
End: 2023-08-31

## 2023-08-31 RX ORDER — PEN NEEDLE, DIABETIC 29 G X1/2"
31G X 5 MM NEEDLE, DISPOSABLE MISCELLANEOUS
Qty: 4 | Refills: 1 | Status: DISCONTINUED | COMMUNITY
Start: 2019-02-11 | End: 2023-08-31

## 2023-08-31 RX ORDER — INSULIN LISPRO 100 [IU]/ML
100 INJECTION, SOLUTION INTRAVENOUS; SUBCUTANEOUS
Refills: 0 | Status: DISCONTINUED | COMMUNITY
Start: 2023-08-31 | End: 2023-08-31

## 2023-08-31 NOTE — PHYSICAL EXAM
[Alert] : alert [Obese] : obese [Clear to Auscultation] : lungs were clear to auscultation bilaterally [Normal S1, S2] : normal S1 and S2 [Normal Rate] : heart rate was normal [Not Tender] : non-tender [Soft] : abdomen soft [Oriented x3] : oriented to person, place, and time [Normal Affect] : the affect was normal [Normal Insight/Judgement] : insight and judgment were intact [Normal Mood] : the mood was normal [No Respiratory Distress] : no respiratory distress

## 2023-08-31 NOTE — ASSESSMENT
[FreeTextEntry1] : 73 yr old female with -  1. T2DM w retinopathy- improved control after gastric bypass surgery, A1c down to 5.7%, reports hypoglycemia this am - stop insulin - cont to monitor sugars 4x daily, if elevated, can try GLP1 agonist - yearly eye exam with ophthalmology -  B12 level okay on MFN, monitor yearly - repeat A1c 3 months  2. HTN -  controlled off ACEi  3. obesity - weight loss after bariatric surgery - cont bariatric diet  4. elevated LFT ?fatty liver- improved w weight loss

## 2023-08-31 NOTE — REVIEW OF SYSTEMS
[SOB on Exertion] : shortness of breath on exertion [As Noted in HPI] : as noted in HPI [Blurred Vision] : no blurred vision [Chest Pain] : no chest pain [Shortness Of Breath] : no shortness of breath [Nausea] : no nausea [Abdominal Pain] : no abdominal pain [Polyuria] : no polyuria [Nocturia] : no nocturia [Pain/Numbness of Digits] : no pain/numbness of digits [Polydipsia] : no polydipsia [FreeTextEntry8] : overactive bladder

## 2023-08-31 NOTE — HISTORY OF PRESENT ILLNESS
[FreeTextEntry1] : Interval Hx-   s/p angie en y gastric bypass surgery 6/2023 and surgery went well, losing weight - lost about 40 pounds, has no appetite FS this am 64 and drank juice  T2DM dx in 2017 with A1c 10.7% - she had been on Kombiglyze and MFN for several years prior to 2017 does not want Riana sensor, worsening due to diet. Tried jardiance sampled but did not tolerate it - vaginal rash, excessive urination, bladder cramps  Current DM meds:  Lantus 18 units nightly Humalog 10 before dinner  SMBG: testing 4x daily,FS 's after surgery, low sugars 1x/weeek  Complications: 2022 eye exam - (+) stable DR by history 2023 neg urine microalb/Cr denies dysesthesias

## 2023-08-31 NOTE — DATA REVIEWED
[FreeTextEntry1] : Labs 9.22.18 Cr 0.91 LDl chol 73 HDl 52 A1c 6.2%  Labs 1/5/18 Cr 0.85, GFR 67 LDl chol 76, Tg 84 A1c 7.3 urine microalb/Cr 0  Labs 4.6.19 Gluc 106, A1c 7.1 Cr 0.81, GFR 71 LDL 66, HDL 55, Tg 68 TSh 3.75  Labs 7/6/19 LDL 66, Tg 76, HDL 55 A1c 7.1  labs 10/5/19 Gluc 162, A1c 7.2 Cr 0.91, GFR 61 LDL 71, HDL 50, Tg 96  Labs 1/4/20 Gluc 139, A1c 7.5 Cr 0.84, GFR 67 LDL 68, Tg 95, HDL 55  Labs 8/8/20 A1c 7.2  Labs 11/14/20 Gluc 151, A1c 7% Cr 0.94, GFR 59 LDL 62, HDL 55, Tg 78 B12 532 vit D 25 urine microalb/Cr neg  Labs 2/20/21 Gluc 103, A1c 6.8 Cr 0.99, GFR 56 HDL 53, Tg 61, 59 TSH 3.00 B12 571 vit D 23 neg urine alb/cr  Labs 6/5/21 Gluc 80, A1c 6.7 Tg 84, HDL 49, LDL 64 AST 34, ALT 47 TSH 2.89 vit D 36 urine alb/Cr neg  "Labs 9/11/21 reviewed A1c 7.1 and LDL chol 71 - good, cont current meds AST 34, ALT 37 - mildly elevated, f/u PCP"  labs 12/1/21 A1c 7.2 LDL 71 AST 35, ALT 39  Labs 3/19/22 Gluc 160, A1c 7.5 Cr 0.99, GFR 55 AST 36, ALT 43 Tg 75, HDL 54, LDL 67 CBC wnl urine alb/Cr neg  Labs 6/18/22 Gluc 163, A1c 7.2 Cr 0.98, GFR 56 AST 33 ALT 41 Tg 89, HDL 52, LDL 56  Labs 9/16/22 Gluc 133, A1c 7% Cr 0.87, GFR 64 AST 35, ALT 54 Tg 86, LDLc 61, HDLc 55  Labs 12/27/22 Gluc 131, A1c 7% Cr 0.85 GFR 66 Tg 88, HDL 55, LDL 66 B12 507 AST 38 ALT 50  Labs 3/28/23 Gluc 127 Cr 0.87, GFR 64 Tg 99, HDL 51,LDL 65 A1c 7.2  Labs 8/5/23 Gluc 104, A1c 5.7 TSH 2.650 Cr 0.76, GFR 84 LDL 54, HDL 43, Trog 65 urine alb/cr 14

## 2023-09-07 ENCOUNTER — APPOINTMENT (OUTPATIENT)
Dept: FAMILY MEDICINE | Facility: CLINIC | Age: 73
End: 2023-09-07
Payer: COMMERCIAL

## 2023-09-07 VITALS
WEIGHT: 212 LBS | DIASTOLIC BLOOD PRESSURE: 80 MMHG | BODY MASS INDEX: 36.19 KG/M2 | RESPIRATION RATE: 14 BRPM | SYSTOLIC BLOOD PRESSURE: 140 MMHG | TEMPERATURE: 98.2 F | HEIGHT: 64 IN | OXYGEN SATURATION: 97 % | HEART RATE: 78 BPM

## 2023-09-07 VITALS — DIASTOLIC BLOOD PRESSURE: 78 MMHG | SYSTOLIC BLOOD PRESSURE: 126 MMHG

## 2023-09-07 DIAGNOSIS — E66.01 MORBID (SEVERE) OBESITY DUE TO EXCESS CALORIES: ICD-10-CM

## 2023-09-07 PROCEDURE — 99214 OFFICE O/P EST MOD 30 MIN: CPT

## 2023-09-07 RX ORDER — MULTIVITAMIN
TABLET ORAL
Refills: 0 | Status: ACTIVE | COMMUNITY

## 2023-09-07 RX ORDER — DIAPER,BRIEF,INFANT-TODD,DISP
EACH MISCELLANEOUS
Refills: 0 | Status: ACTIVE | COMMUNITY

## 2023-09-07 RX ORDER — CHLORHEXIDINE GLUCONATE 4 %
1000 LIQUID (ML) TOPICAL
Refills: 0 | Status: ACTIVE | COMMUNITY

## 2023-09-07 NOTE — PLAN
[FreeTextEntry1] : the endocrinology note and lab report including CMP, lipids, urine testing, A1C and TSH was reviewed.  For the hypertension she will remain off of antihypertensive medication at this time, she will continue to take oxybutynin 5 mg tid for the OA symptoms and was advised to follow up in 3-4 months for a well visit or sooner prn

## 2023-09-07 NOTE — HISTORY OF PRESENT ILLNESS
[FreeTextEntry1] : patient presents for blood pressure check  [de-identified] : She has lost almost 40 pounds and is off most of her medications.  Her blood sugar is still a little  high at times.

## 2023-10-09 ENCOUNTER — APPOINTMENT (OUTPATIENT)
Dept: FAMILY MEDICINE | Facility: CLINIC | Age: 73
End: 2023-10-09
Payer: COMMERCIAL

## 2023-10-09 VITALS
SYSTOLIC BLOOD PRESSURE: 130 MMHG | WEIGHT: 212 LBS | DIASTOLIC BLOOD PRESSURE: 82 MMHG | BODY MASS INDEX: 36.19 KG/M2 | RESPIRATION RATE: 14 BRPM | OXYGEN SATURATION: 97 % | HEIGHT: 64 IN | HEART RATE: 74 BPM | TEMPERATURE: 98.1 F

## 2023-10-09 PROCEDURE — 36415 COLL VENOUS BLD VENIPUNCTURE: CPT

## 2023-10-09 PROCEDURE — 99213 OFFICE O/P EST LOW 20 MIN: CPT | Mod: 25

## 2023-10-10 LAB
ALBUMIN SERPL ELPH-MCNC: 4.4 G/DL
ALP BLD-CCNC: 82 U/L
ALT SERPL-CCNC: 29 U/L
ANION GAP SERPL CALC-SCNC: 10 MMOL/L
AST SERPL-CCNC: 30 U/L
BASOPHILS # BLD AUTO: 0.05 K/UL
BASOPHILS NFR BLD AUTO: 0.9 %
BILIRUB SERPL-MCNC: 0.7 MG/DL
BUN SERPL-MCNC: 17 MG/DL
CALCIUM SERPL-MCNC: 9.7 MG/DL
CHLORIDE SERPL-SCNC: 104 MMOL/L
CO2 SERPL-SCNC: 26 MMOL/L
CREAT SERPL-MCNC: 0.66 MG/DL
EGFR: 93 ML/MIN/1.73M2
EOSINOPHIL # BLD AUTO: 0.18 K/UL
EOSINOPHIL NFR BLD AUTO: 3.1 %
GLUCOSE SERPL-MCNC: 82 MG/DL
HCT VFR BLD CALC: 32.5 %
HGB BLD-MCNC: 10.7 G/DL
IMM GRANULOCYTES NFR BLD AUTO: 0.3 %
LYMPHOCYTES # BLD AUTO: 2.49 K/UL
LYMPHOCYTES NFR BLD AUTO: 42.7 %
MAN DIFF?: NORMAL
MCHC RBC-ENTMCNC: 30.9 PG
MCHC RBC-ENTMCNC: 32.9 GM/DL
MCV RBC AUTO: 93.9 FL
MONOCYTES # BLD AUTO: 0.43 K/UL
MONOCYTES NFR BLD AUTO: 7.4 %
NEUTROPHILS # BLD AUTO: 2.66 K/UL
NEUTROPHILS NFR BLD AUTO: 45.6 %
PLATELET # BLD AUTO: 250 K/UL
POTASSIUM SERPL-SCNC: 4.1 MMOL/L
PROT SERPL-MCNC: 7.1 G/DL
RBC # BLD: 3.46 M/UL
RBC # FLD: 14.9 %
SODIUM SERPL-SCNC: 140 MMOL/L
WBC # FLD AUTO: 5.83 K/UL

## 2023-11-28 LAB
HBA1C MFR BLD HPLC: 5.6
LDLC SERPL DIRECT ASSAY-MCNC: 40

## 2023-11-29 ENCOUNTER — APPOINTMENT (OUTPATIENT)
Dept: ENDOCRINOLOGY | Facility: CLINIC | Age: 73
End: 2023-11-29
Payer: COMMERCIAL

## 2023-11-29 VITALS
HEART RATE: 66 BPM | WEIGHT: 191 LBS | HEIGHT: 64 IN | SYSTOLIC BLOOD PRESSURE: 134 MMHG | DIASTOLIC BLOOD PRESSURE: 78 MMHG | BODY MASS INDEX: 32.61 KG/M2 | OXYGEN SATURATION: 98 %

## 2023-11-29 VITALS
HEART RATE: 66 BPM | HEIGHT: 64 IN | SYSTOLIC BLOOD PRESSURE: 134 MMHG | BODY MASS INDEX: 32.61 KG/M2 | DIASTOLIC BLOOD PRESSURE: 78 MMHG | WEIGHT: 191 LBS

## 2023-11-29 DIAGNOSIS — Z79.4 LONG TERM (CURRENT) USE OF INSULIN: ICD-10-CM

## 2023-11-29 DIAGNOSIS — Z13.820 ENCOUNTER FOR SCREENING FOR OSTEOPOROSIS: ICD-10-CM

## 2023-11-29 LAB — GLUCOSE BLDC GLUCOMTR-MCNC: 107

## 2023-11-29 PROCEDURE — 99214 OFFICE O/P EST MOD 30 MIN: CPT | Mod: 25

## 2023-11-29 PROCEDURE — 82962 GLUCOSE BLOOD TEST: CPT

## 2024-01-08 ENCOUNTER — APPOINTMENT (OUTPATIENT)
Dept: FAMILY MEDICINE | Facility: CLINIC | Age: 74
End: 2024-01-08
Payer: COMMERCIAL

## 2024-01-08 VITALS — DIASTOLIC BLOOD PRESSURE: 72 MMHG | SYSTOLIC BLOOD PRESSURE: 134 MMHG

## 2024-01-08 VITALS
RESPIRATION RATE: 14 BRPM | HEIGHT: 64 IN | SYSTOLIC BLOOD PRESSURE: 148 MMHG | WEIGHT: 190 LBS | HEART RATE: 73 BPM | OXYGEN SATURATION: 97 % | BODY MASS INDEX: 32.44 KG/M2 | DIASTOLIC BLOOD PRESSURE: 80 MMHG

## 2024-01-08 DIAGNOSIS — R74.8 ABNORMAL LEVELS OF OTHER SERUM ENZYMES: ICD-10-CM

## 2024-01-08 DIAGNOSIS — Z86.19 PERSONAL HISTORY OF OTHER INFECTIOUS AND PARASITIC DISEASES: ICD-10-CM

## 2024-01-08 DIAGNOSIS — S76.019A STRAIN OF MUSCLE, FASCIA AND TENDON OF UNSPECIFIED HIP, INITIAL ENCOUNTER: ICD-10-CM

## 2024-01-08 DIAGNOSIS — N32.81 OVERACTIVE BLADDER: ICD-10-CM

## 2024-01-08 PROCEDURE — G2211 COMPLEX E/M VISIT ADD ON: CPT

## 2024-01-08 PROCEDURE — 99214 OFFICE O/P EST MOD 30 MIN: CPT

## 2024-01-08 RX ORDER — ADHESIVE TAPE 3"X 2.3 YD
50 MCG TAPE, NON-MEDICATED TOPICAL
Refills: 0 | Status: DISCONTINUED | COMMUNITY
End: 2024-01-08

## 2024-02-07 RX ORDER — CLOBETASOL PROPIONATE 0.5 MG/G
0.05 CREAM TOPICAL TWICE DAILY
Qty: 60 | Refills: 0 | Status: ACTIVE | COMMUNITY
Start: 2018-01-24 | End: 1900-01-01

## 2024-03-13 NOTE — CURRENT MEDS
[Takes medication as prescribed] : takes [None] : Patient does not have any barriers to medication adherence [Yes] : Reviewed medication list for presence of high-risk medications. [facility]

## 2024-03-20 RX ORDER — FLURANDRENOLIDE 4 UG/CM2
4 TAPE TOPICAL
Qty: 1 | Refills: 2 | Status: ACTIVE | COMMUNITY
Start: 2024-02-07

## 2024-05-29 LAB
HBA1C MFR BLD HPLC: 6.8
LDLC SERPL DIRECT ASSAY-MCNC: 58
MICROALBUMIN/CREAT 24H UR-RTO: 9

## 2024-05-30 ENCOUNTER — APPOINTMENT (OUTPATIENT)
Dept: ENDOCRINOLOGY | Facility: CLINIC | Age: 74
End: 2024-05-30
Payer: COMMERCIAL

## 2024-05-30 VITALS
BODY MASS INDEX: 33.29 KG/M2 | SYSTOLIC BLOOD PRESSURE: 138 MMHG | DIASTOLIC BLOOD PRESSURE: 78 MMHG | HEIGHT: 64 IN | OXYGEN SATURATION: 98 % | HEART RATE: 63 BPM | WEIGHT: 195 LBS

## 2024-05-30 DIAGNOSIS — E53.8 DEFICIENCY OF OTHER SPECIFIED B GROUP VITAMINS: ICD-10-CM

## 2024-05-30 DIAGNOSIS — Z79.4 TYPE 2 DIABETES MELLITUS WITH HYPERGLYCEMIA: ICD-10-CM

## 2024-05-30 DIAGNOSIS — E11.65 TYPE 2 DIABETES MELLITUS WITH HYPERGLYCEMIA: ICD-10-CM

## 2024-05-30 DIAGNOSIS — E55.9 VITAMIN D DEFICIENCY, UNSPECIFIED: ICD-10-CM

## 2024-05-30 LAB — GLUCOSE BLDC GLUCOMTR-MCNC: 92

## 2024-05-30 PROCEDURE — 99214 OFFICE O/P EST MOD 30 MIN: CPT

## 2024-05-30 PROCEDURE — 82962 GLUCOSE BLOOD TEST: CPT

## 2024-05-30 RX ORDER — OMEPRAZOLE 40 MG/1
40 CAPSULE, DELAYED RELEASE ORAL
Qty: 30 | Refills: 2 | Status: DISCONTINUED | COMMUNITY
End: 2024-05-30

## 2024-05-30 NOTE — ASSESSMENT
[FreeTextEntry1] : 73 yr old female with - 1. T2DM w retinopathy- improved control after gastric bypass surgery, A1c 6.8% up since last visit due to increased PO intake and has been off shakes - no DM meds needed - cont bariatric diet - cont SMBG, call with highs - yearly eye exam with ophthalmology - B12 level okay on MFN, monitor yearly - repeat A1c 6 months  2. HTN - controlled off ACEi  3. 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

## 2024-05-30 NOTE — PHYSICAL EXAM
[Alert] : alert [Obese] : obese [No Respiratory Distress] : no respiratory distress [Clear to Auscultation] : lungs were clear to auscultation bilaterally [Normal S1, S2] : normal S1 and S2 [Normal Rate] : heart rate was normal [Not Tender] : non-tender [Soft] : abdomen soft [Oriented x3] : oriented to person, place, and time [Normal Affect] : the affect was normal [Normal Insight/Judgement] : insight and judgment were intact [Normal Mood] : the mood was normal

## 2024-05-30 NOTE — DATA REVIEWED
[FreeTextEntry1] : DXA 4/6/24 osteopenia Left FN  Labs 9.22.18 Cr 0.91 LDl chol 73 HDl 52 A1c 6.2%  Labs 1/5/18 Cr 0.85, GFR 67 LDl chol 76, Tg 84 A1c 7.3 urine microalb/Cr 0  Labs 4.6.19 Gluc 106, A1c 7.1 Cr 0.81, GFR 71 LDL 66, HDL 55, Tg 68 TSh 3.75  Labs 7/6/19 LDL 66, Tg 76, HDL 55 A1c 7.1  labs 10/5/19 Gluc 162, A1c 7.2 Cr 0.91, GFR 61 LDL 71, HDL 50, Tg 96  Labs 1/4/20 Gluc 139, A1c 7.5 Cr 0.84, GFR 67 LDL 68, Tg 95, HDL 55  Labs 8/8/20 A1c 7.2  Labs 11/14/20 Gluc 151, A1c 7% Cr 0.94, GFR 59 LDL 62, HDL 55, Tg 78 B12 532 vit D 25 urine microalb/Cr neg  Labs 2/20/21 Gluc 103, A1c 6.8 Cr 0.99, GFR 56 HDL 53, Tg 61, 59 TSH 3.00 B12 571 vit D 23 neg urine alb/cr  Labs 6/5/21 Gluc 80, A1c 6.7 Tg 84, HDL 49, LDL 64 AST 34, ALT 47 TSH 2.89 vit D 36 urine alb/Cr neg  "Labs 9/11/21 reviewed A1c 7.1 and LDL chol 71 - good, cont current meds AST 34, ALT 37 - mildly elevated, f/u PCP"  labs 12/1/21 A1c 7.2 LDL 71 AST 35, ALT 39  Labs 3/19/22 Gluc 160, A1c 7.5 Cr 0.99, GFR 55 AST 36, ALT 43 Tg 75, HDL 54, LDL 67 CBC wnl urine alb/Cr neg  Labs 6/18/22 Gluc 163, A1c 7.2 Cr 0.98, GFR 56 AST 33 ALT 41 Tg 89, HDL 52, LDL 56  Labs 9/16/22 Gluc 133, A1c 7% Cr 0.87, GFR 64 AST 35, ALT 54 Tg 86, LDLc 61, HDLc 55  Labs 12/27/22 Gluc 131, A1c 7% Cr 0.85 GFR 66 Tg 88, HDL 55, LDL 66 B12 507 AST 38 ALT 50  Labs 3/28/23 Gluc 127 Cr 0.87, GFR 64 Tg 99, HDL 51,LDL 65 A1c 7.2  Labs 8/5/23 Gluc 104, A1c 5.7 TSH 2.650 Cr 0.76, GFR 84 LDL 54, HDL 43, Trog 65 urine alb/cr 14  Labs 11/18/23 Gluc 106, A1c 5.6 Cr 0.71, GFR 90 LDL 40, HDL 60, Trig 52  "Labs 3/2/24 LDL 58 A1c 6.8 Vit D and B12 okay Normal thyroid level urine alb/Cr neg"

## 2024-05-30 NOTE — REVIEW OF SYSTEMS
[As Noted in HPI] : as noted in HPI [Recent Weight Loss (___ Lbs)] : recent weight loss: [unfilled] lbs [SOB on Exertion] : shortness of breath on exertion [Blurred Vision] : no blurred vision [Chest Pain] : no chest pain [Shortness Of Breath] : no shortness of breath [Nausea] : no nausea [Abdominal Pain] : no abdominal pain [Polyuria] : no polyuria [Nocturia] : no nocturia [Pain/Numbness of Digits] : no pain/numbness of digits [Polydipsia] : no polydipsia [FreeTextEntry8] : overactive bladder

## 2024-05-30 NOTE — HISTORY OF PRESENT ILLNESS
[FreeTextEntry1] : Interval Hx-   s/p angie en y gastric bypass surgery 6/2023 and surgery went well, losing weight no issues today  T2DM dx in 2017 with A1c 10.7% - she had been on Kombiglyze and MFN for several years prior to 2017 does not want Riana sensor, worsening due to diet. Tried jardiance sampled but did not tolerate it - vaginal rash, excessive urination, bladder cramps  Current DM meds: none since bariatric surgery  SMBG: testing 1x daily, FS 30 day avg 105  Complications: 2022 eye exam - (+) stable DR by history 2023 neg urine microalb/Cr denies dysesthesias

## 2024-06-03 ENCOUNTER — APPOINTMENT (OUTPATIENT)
Dept: FAMILY MEDICINE | Facility: CLINIC | Age: 74
End: 2024-06-03
Payer: COMMERCIAL

## 2024-06-03 VITALS
RESPIRATION RATE: 14 BRPM | WEIGHT: 194 LBS | HEIGHT: 64 IN | HEART RATE: 73 BPM | SYSTOLIC BLOOD PRESSURE: 170 MMHG | OXYGEN SATURATION: 98 % | DIASTOLIC BLOOD PRESSURE: 86 MMHG | TEMPERATURE: 98.3 F | BODY MASS INDEX: 33.12 KG/M2

## 2024-06-03 VITALS — DIASTOLIC BLOOD PRESSURE: 72 MMHG | SYSTOLIC BLOOD PRESSURE: 168 MMHG

## 2024-06-03 DIAGNOSIS — M25.552 PAIN IN LEFT HIP: ICD-10-CM

## 2024-06-03 DIAGNOSIS — S09.90XA UNSPECIFIED INJURY OF HEAD, INITIAL ENCOUNTER: ICD-10-CM

## 2024-06-03 DIAGNOSIS — M54.50 LOW BACK PAIN, UNSPECIFIED: ICD-10-CM

## 2024-06-03 DIAGNOSIS — I10 ESSENTIAL (PRIMARY) HYPERTENSION: ICD-10-CM

## 2024-06-03 DIAGNOSIS — M25.562 PAIN IN LEFT KNEE: ICD-10-CM

## 2024-06-03 PROCEDURE — 99214 OFFICE O/P EST MOD 30 MIN: CPT

## 2024-06-03 RX ORDER — OXYBUTYNIN CHLORIDE 5 MG/1
5 TABLET ORAL
Qty: 90 | Refills: 5 | Status: ACTIVE | COMMUNITY
Start: 2017-01-16 | End: 1900-01-01

## 2024-06-03 NOTE — PLAN
[FreeTextEntry1] : She was advised to follow up in the office in about 2 weeks for a follow up on the hypertension but she's unable to commit to scheduling the appointment as she doesn't know when her daughter will need to start treatment.  For the acute pain due to her injuries she will apply ice and take Tylenol as needed and follow up ASAP for any worsening symptoms.  I reviewed the PHQ 9 result which she attributes to the news about her daughter.  I reviewed the endocrinology note and the recent labs.

## 2024-06-03 NOTE — REVIEW OF SYSTEMS
[Joint Pain] : joint pain [Joint Stiffness] : joint stiffness [Muscle Pain] : muscle pain [Back Pain] : back pain [Headache] : headache [Negative] : Heme/Lymph [de-identified] : feels anxious and upset about her daughter's health

## 2024-06-03 NOTE — HISTORY OF PRESENT ILLNESS
[FreeTextEntry1] : patient presents for follow up for a fall [de-identified] : She presents for her routine blood pressure check. This morning her puppy ran into her and she fell backward hitting her head and then her head came forward and hit the floor again.  She has pain in her neck, her lower back, left knee and left hip.  She has a headache.  She hasn't had any nausea, vomiting or dizziness.  She took Tylenol earlier today.  Unfortunately her daughter just diagnosed with recurrent lymphoma and will need to stay in [facility] for 1 month while she undergoes treatment and Soheila will need to stay with her.

## 2024-06-03 NOTE — PHYSICAL EXAM
[No Acute Distress] : no acute distress [Well Nourished] : well nourished [Well Developed] : well developed [Well-Appearing] : well-appearing [Normal Voice/Communication] : normal voice/communication [PERRL] : pupils equal round and reactive to light [EOMI] : extraocular movements intact [No Respiratory Distress] : no respiratory distress  [No Accessory Muscle Use] : no accessory muscle use [Clear to Auscultation] : lungs were clear to auscultation bilaterally [Normal Rate] : normal rate  [Regular Rhythm] : with a regular rhythm [Normal S1, S2] : normal S1 and S2 [No Edema] : there was no peripheral edema [Coordination Grossly Intact] : coordination grossly intact [Normal Mood] : the mood was normal [de-identified] : mild tenderness on palpation of lower back [de-identified] : has good ROM of left knee and left hip but has pain with movement, tenderness on palpation of left posterior neck

## 2024-06-21 DIAGNOSIS — M79.89 OTHER SPECIFIED SOFT TISSUE DISORDERS: ICD-10-CM

## 2024-06-28 ENCOUNTER — TRANSCRIPTION ENCOUNTER (OUTPATIENT)
Age: 74
End: 2024-06-28
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
## 2025-01-20 ENCOUNTER — APPOINTMENT (OUTPATIENT)
Dept: FAMILY MEDICINE | Facility: CLINIC | Age: 75
End: 2025-01-20

## 2025-01-20 VITALS
HEIGHT: 64 IN | HEART RATE: 99 BPM | TEMPERATURE: 98.2 F | OXYGEN SATURATION: 98 % | WEIGHT: 189 LBS | SYSTOLIC BLOOD PRESSURE: 140 MMHG | RESPIRATION RATE: 14 BRPM | BODY MASS INDEX: 32.27 KG/M2 | DIASTOLIC BLOOD PRESSURE: 80 MMHG

## 2025-01-20 DIAGNOSIS — Z23 ENCOUNTER FOR IMMUNIZATION: ICD-10-CM
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
## 2025-01-20 DIAGNOSIS — S91.359A OPEN BITE, UNSPECIFIED FOOT, INITIAL ENCOUNTER: ICD-10-CM

## 2025-01-20 PROCEDURE — 99213 OFFICE O/P EST LOW 20 MIN: CPT | Mod: 25

## 2025-01-20 PROCEDURE — G0008: CPT

## 2025-01-20 PROCEDURE — 15853 REMOVAL SUTR/STAPL XREQ ANES: CPT

## 2025-01-20 PROCEDURE — G2211 COMPLEX E/M VISIT ADD ON: CPT | Mod: NC

## 2025-01-20 PROCEDURE — 90662 IIV NO PRSV INCREASED AG IM: CPT

## 2025-01-22 LAB
HBA1C MFR BLD HPLC: 10.2
LDLC SERPL DIRECT ASSAY-MCNC: 38

## 2025-01-23 ENCOUNTER — APPOINTMENT (OUTPATIENT)
Dept: ENDOCRINOLOGY | Facility: CLINIC | Age: 75
End: 2025-01-23
Payer: COMMERCIAL

## 2025-01-23 VITALS
DIASTOLIC BLOOD PRESSURE: 80 MMHG | HEART RATE: 84 BPM | SYSTOLIC BLOOD PRESSURE: 130 MMHG | OXYGEN SATURATION: 98 % | BODY MASS INDEX: 31.58 KG/M2 | WEIGHT: 185 LBS | HEIGHT: 64 IN

## 2025-01-23 DIAGNOSIS — I10 ESSENTIAL (PRIMARY) HYPERTENSION: ICD-10-CM

## 2025-01-23 DIAGNOSIS — Z79.4 TYPE 2 DIABETES MELLITUS WITH HYPERGLYCEMIA: ICD-10-CM

## 2025-01-23 DIAGNOSIS — E11.65 TYPE 2 DIABETES MELLITUS WITH HYPERGLYCEMIA: ICD-10-CM

## 2025-01-23 LAB — GLUCOSE BLDC GLUCOMTR-MCNC: 213

## 2025-01-23 PROCEDURE — 82962 GLUCOSE BLOOD TEST: CPT

## 2025-01-23 PROCEDURE — 99215 OFFICE O/P EST HI 40 MIN: CPT

## 2025-01-23 RX ORDER — LISINOPRIL 20 MG/1
20 TABLET ORAL
Qty: 90 | Refills: 0 | Status: ACTIVE | COMMUNITY
Start: 2025-01-15

## 2025-01-24 ENCOUNTER — NON-APPOINTMENT (OUTPATIENT)
Age: 75
End: 2025-01-24

## 2025-01-28 RX ORDER — TIRZEPATIDE 2.5 MG/.5ML
2.5 INJECTION, SOLUTION SUBCUTANEOUS
Qty: 1 | Refills: 1 | Status: ACTIVE | COMMUNITY
Start: 2025-01-23

## 2025-02-04 ENCOUNTER — APPOINTMENT (OUTPATIENT)
Dept: PODIATRY | Facility: CLINIC | Age: 75
End: 2025-02-04
Payer: COMMERCIAL

## 2025-02-04 DIAGNOSIS — L60.0 INGROWING NAIL: ICD-10-CM

## 2025-02-04 DIAGNOSIS — Z79.4 TYPE 2 DIABETES MELLITUS WITH HYPERGLYCEMIA: ICD-10-CM

## 2025-02-04 DIAGNOSIS — B35.1 TINEA UNGUIUM: ICD-10-CM

## 2025-02-04 DIAGNOSIS — S91.359A OPEN BITE, UNSPECIFIED FOOT, INITIAL ENCOUNTER: ICD-10-CM

## 2025-02-04 DIAGNOSIS — M79.675 PAIN IN LEFT TOE(S): ICD-10-CM

## 2025-02-04 DIAGNOSIS — E11.65 TYPE 2 DIABETES MELLITUS WITH HYPERGLYCEMIA: ICD-10-CM
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
## 2025-02-04 PROCEDURE — 99204 OFFICE O/P NEW MOD 45 MIN: CPT | Mod: 25

## 2025-02-04 PROCEDURE — 29540 STRAPPING ANKLE &/FOOT: CPT | Mod: LT

## 2025-02-06 PROBLEM — M79.675 PAIN OF TOE OF LEFT FOOT: Status: ACTIVE | Noted: 2025-02-06

## 2025-02-06 PROBLEM — L60.0 INGROWN LEFT BIG TOENAIL: Status: ACTIVE | Noted: 2025-02-06

## 2025-02-06 PROBLEM — B35.1 ONYCHOMYCOSIS OF TOENAIL: Status: ACTIVE | Noted: 2025-02-04

## 2025-02-17 ENCOUNTER — NON-APPOINTMENT (OUTPATIENT)
Age: 75
End: 2025-02-17

## 2025-03-11 ENCOUNTER — TRANSCRIPTION ENCOUNTER (OUTPATIENT)
Age: 75
End: 2025-03-11

## 2025-03-20 ENCOUNTER — APPOINTMENT (OUTPATIENT)
Dept: ENDOCRINOLOGY | Facility: CLINIC | Age: 75
End: 2025-03-20
Payer: COMMERCIAL

## 2025-03-20 VITALS
HEIGHT: 64 IN | OXYGEN SATURATION: 97 % | BODY MASS INDEX: 32.78 KG/M2 | DIASTOLIC BLOOD PRESSURE: 74 MMHG | HEART RATE: 70 BPM | WEIGHT: 192 LBS | SYSTOLIC BLOOD PRESSURE: 132 MMHG

## 2025-03-20 DIAGNOSIS — E11.65 TYPE 2 DIABETES MELLITUS WITH HYPERGLYCEMIA: ICD-10-CM

## 2025-03-20 DIAGNOSIS — I10 ESSENTIAL (PRIMARY) HYPERTENSION: ICD-10-CM

## 2025-03-20 DIAGNOSIS — Z79.4 TYPE 2 DIABETES MELLITUS WITH HYPERGLYCEMIA: ICD-10-CM

## 2025-03-20 DIAGNOSIS — M85.80 OTHER SPECIFIED DISORDERS OF BONE DENSITY AND STRUCTURE, UNSPECIFIED SITE: ICD-10-CM

## 2025-03-20 LAB — GLUCOSE BLDC GLUCOMTR-MCNC: 100

## 2025-03-20 PROCEDURE — 82962 GLUCOSE BLOOD TEST: CPT

## 2025-03-20 PROCEDURE — 99214 OFFICE O/P EST MOD 30 MIN: CPT

## 2025-05-06 LAB
HBA1C MFR BLD HPLC: 6.1
LDLC SERPL DIRECT ASSAY-MCNC: 58
MICROALBUMIN/CREAT 24H UR-RTO: <6

## 2025-05-07 ENCOUNTER — NON-APPOINTMENT (OUTPATIENT)
Age: 75
End: 2025-05-07

## 2025-05-08 ENCOUNTER — APPOINTMENT (OUTPATIENT)
Dept: ENDOCRINOLOGY | Facility: CLINIC | Age: 75
End: 2025-05-08
Payer: COMMERCIAL

## 2025-05-08 VITALS
WEIGHT: 186 LBS | SYSTOLIC BLOOD PRESSURE: 128 MMHG | HEART RATE: 91 BPM | BODY MASS INDEX: 31.76 KG/M2 | DIASTOLIC BLOOD PRESSURE: 78 MMHG | HEIGHT: 64 IN | OXYGEN SATURATION: 97 %

## 2025-05-08 DIAGNOSIS — I10 ESSENTIAL (PRIMARY) HYPERTENSION: ICD-10-CM

## 2025-05-08 DIAGNOSIS — Z79.4 TYPE 2 DIABETES MELLITUS WITH HYPERGLYCEMIA: ICD-10-CM

## 2025-05-08 DIAGNOSIS — E11.65 TYPE 2 DIABETES MELLITUS WITH HYPERGLYCEMIA: ICD-10-CM

## 2025-05-08 DIAGNOSIS — M85.80 OTHER SPECIFIED DISORDERS OF BONE DENSITY AND STRUCTURE, UNSPECIFIED SITE: ICD-10-CM

## 2025-05-08 DIAGNOSIS — E66.9 OBESITY, UNSPECIFIED: ICD-10-CM

## 2025-05-08 LAB — GLUCOSE BLDC GLUCOMTR-MCNC: 105

## 2025-05-08 PROCEDURE — 99214 OFFICE O/P EST MOD 30 MIN: CPT

## 2025-05-08 PROCEDURE — 82962 GLUCOSE BLOOD TEST: CPT

## 2025-06-02 ENCOUNTER — NON-APPOINTMENT (OUTPATIENT)
Age: 75
End: 2025-06-02

## 2025-06-02 RX ORDER — TERBINAFINE HYDROCHLORIDE 250 MG/1
250 TABLET ORAL DAILY
Qty: 60 | Refills: 0 | Status: ACTIVE | COMMUNITY
Start: 2025-06-02 | End: 1900-01-01

## 2025-06-06 ENCOUNTER — NON-APPOINTMENT (OUTPATIENT)
Age: 75
End: 2025-06-06

## 2025-07-17 ENCOUNTER — APPOINTMENT (OUTPATIENT)
Dept: PODIATRY | Facility: CLINIC | Age: 75
End: 2025-07-17
Payer: COMMERCIAL

## 2025-07-17 PROCEDURE — 99214 OFFICE O/P EST MOD 30 MIN: CPT

## 2025-07-17 RX ORDER — TERBINAFINE HYDROCHLORIDE 250 MG/1
250 TABLET ORAL DAILY
Qty: 30 | Refills: 0 | Status: ACTIVE | COMMUNITY
Start: 2025-07-17 | End: 1900-01-01

## 2025-09-10 LAB
HBA1C MFR BLD HPLC: 6.3
LDLC SERPL DIRECT ASSAY-MCNC: 85
MICROALBUMIN/CREAT 24H UR-RTO: 10
TSH SERPL-ACNC: 2.23

## 2025-09-11 ENCOUNTER — APPOINTMENT (OUTPATIENT)
Dept: ENDOCRINOLOGY | Facility: CLINIC | Age: 75
End: 2025-09-11
Payer: COMMERCIAL

## 2025-09-11 VITALS
HEART RATE: 86 BPM | OXYGEN SATURATION: 99 % | HEIGHT: 64 IN | DIASTOLIC BLOOD PRESSURE: 76 MMHG | SYSTOLIC BLOOD PRESSURE: 132 MMHG | WEIGHT: 175 LBS | BODY MASS INDEX: 29.88 KG/M2

## 2025-09-11 DIAGNOSIS — E11.65 TYPE 2 DIABETES MELLITUS WITH HYPERGLYCEMIA: ICD-10-CM

## 2025-09-11 DIAGNOSIS — M85.80 OTHER SPECIFIED DISORDERS OF BONE DENSITY AND STRUCTURE, UNSPECIFIED SITE: ICD-10-CM

## 2025-09-11 DIAGNOSIS — Z79.4 TYPE 2 DIABETES MELLITUS WITH HYPERGLYCEMIA: ICD-10-CM

## 2025-09-11 DIAGNOSIS — E66.9 OBESITY, UNSPECIFIED: ICD-10-CM

## 2025-09-11 DIAGNOSIS — I10 ESSENTIAL (PRIMARY) HYPERTENSION: ICD-10-CM

## 2025-09-11 LAB — GLUCOSE BLDC GLUCOMTR-MCNC: 119

## 2025-09-11 PROCEDURE — 99214 OFFICE O/P EST MOD 30 MIN: CPT

## 2025-09-11 PROCEDURE — G2211 COMPLEX E/M VISIT ADD ON: CPT | Mod: NC

## 2025-09-11 PROCEDURE — 82962 GLUCOSE BLOOD TEST: CPT

## 2025-09-11 RX ORDER — BLOOD SUGAR DIAGNOSTIC
STRIP MISCELLANEOUS
Qty: 2 | Refills: 3 | Status: ACTIVE | COMMUNITY
Start: 2025-09-11 | End: 1900-01-01

## 2025-09-11 RX ORDER — LANCETS
EACH MISCELLANEOUS
Qty: 2 | Refills: 3 | Status: ACTIVE | COMMUNITY
Start: 2025-09-11 | End: 1900-01-01